# Patient Record
Sex: MALE | Race: WHITE | NOT HISPANIC OR LATINO | Employment: OTHER | URBAN - METROPOLITAN AREA
[De-identification: names, ages, dates, MRNs, and addresses within clinical notes are randomized per-mention and may not be internally consistent; named-entity substitution may affect disease eponyms.]

---

## 2017-09-15 ENCOUNTER — HOSPITAL ENCOUNTER (EMERGENCY)
Facility: HOSPITAL | Age: 47
Discharge: HOME | End: 2017-09-15
Attending: EMERGENCY MEDICINE | Admitting: EMERGENCY MEDICINE
Payer: COMMERCIAL

## 2017-09-15 VITALS
SYSTOLIC BLOOD PRESSURE: 134 MMHG | OXYGEN SATURATION: 97 % | DIASTOLIC BLOOD PRESSURE: 73 MMHG | TEMPERATURE: 98.1 F | RESPIRATION RATE: 16 BRPM | HEART RATE: 78 BPM | WEIGHT: 179.4 LBS

## 2017-09-15 DIAGNOSIS — S81.811A LACERATION OF LEG, RIGHT, INITIAL ENCOUNTER: Primary | ICD-10-CM

## 2017-09-15 PROCEDURE — 99282 EMERGENCY DEPT VISIT SF MDM: CPT

## 2017-09-15 RX ORDER — OMEPRAZOLE 20 MG/1
40 CAPSULE, DELAYED RELEASE ORAL DAILY
COMMUNITY

## 2017-09-15 RX ORDER — LIDOCAINE HYDROCHLORIDE 10 MG/ML
5 INJECTION, SOLUTION EPIDURAL; INFILTRATION; INTRACAUDAL; PERINEURAL ONCE
Status: COMPLETED | OUTPATIENT
Start: 2017-09-15 | End: 2017-09-15

## 2017-09-15 RX ADMIN — LIDOCAINE HYDROCHLORIDE 5 ML: 10 INJECTION, SOLUTION EPIDURAL; INFILTRATION; INTRACAUDAL; PERINEURAL at 11:06

## 2019-07-27 ENCOUNTER — APPOINTMENT (EMERGENCY)
Dept: RADIOLOGY | Facility: HOSPITAL | Age: 49
End: 2019-07-27
Payer: COMMERCIAL

## 2019-07-27 ENCOUNTER — HOSPITAL ENCOUNTER (EMERGENCY)
Facility: HOSPITAL | Age: 49
Discharge: HOME | End: 2019-07-27
Attending: EMERGENCY MEDICINE
Payer: COMMERCIAL

## 2019-07-27 VITALS
WEIGHT: 180 LBS | RESPIRATION RATE: 15 BRPM | SYSTOLIC BLOOD PRESSURE: 114 MMHG | HEIGHT: 69 IN | TEMPERATURE: 98.1 F | BODY MASS INDEX: 26.66 KG/M2 | DIASTOLIC BLOOD PRESSURE: 63 MMHG | OXYGEN SATURATION: 96 % | HEART RATE: 66 BPM

## 2019-07-27 DIAGNOSIS — R07.9 CHEST PAIN: Primary | ICD-10-CM

## 2019-07-27 DIAGNOSIS — M79.10 MYALGIA: ICD-10-CM

## 2019-07-27 LAB
ANION GAP SERPL CALCULATED.3IONS-SCNC: 7 MMOL/L (ref 4–13)
BASOPHILS # BLD AUTO: 0.02 THOUSANDS/ΜL (ref 0–0.1)
BASOPHILS NFR BLD AUTO: 0 % (ref 0–1)
BUN SERPL-MCNC: 12 MG/DL (ref 5–25)
CALCIUM SERPL-MCNC: 8.4 MG/DL (ref 8.3–10.1)
CHLORIDE SERPL-SCNC: 109 MMOL/L (ref 100–108)
CK SERPL-CCNC: 86 U/L (ref 39–308)
CO2 SERPL-SCNC: 25 MMOL/L (ref 21–32)
CREAT SERPL-MCNC: 0.94 MG/DL (ref 0.6–1.3)
EOSINOPHIL # BLD AUTO: 0.08 THOUSAND/ΜL (ref 0–0.61)
EOSINOPHIL NFR BLD AUTO: 1 % (ref 0–6)
ERYTHROCYTE [DISTWIDTH] IN BLOOD BY AUTOMATED COUNT: 12.3 % (ref 11.6–15.1)
GFR SERPL CREATININE-BSD FRML MDRD: 97 ML/MIN/1.73SQ M
GLUCOSE SERPL-MCNC: 168 MG/DL (ref 65–140)
HCT VFR BLD AUTO: 44.7 % (ref 36.5–49.3)
HGB BLD-MCNC: 15.5 G/DL (ref 12–17)
IMM GRANULOCYTES # BLD AUTO: 0.01 THOUSAND/UL (ref 0–0.2)
IMM GRANULOCYTES NFR BLD AUTO: 0 % (ref 0–2)
LYMPHOCYTES # BLD AUTO: 1.3 THOUSANDS/ΜL (ref 0.6–4.47)
LYMPHOCYTES NFR BLD AUTO: 22 % (ref 14–44)
MCH RBC QN AUTO: 29.6 PG (ref 26.8–34.3)
MCHC RBC AUTO-ENTMCNC: 34.7 G/DL (ref 31.4–37.4)
MCV RBC AUTO: 86 FL (ref 82–98)
MONOCYTES # BLD AUTO: 0.47 THOUSAND/ΜL (ref 0.17–1.22)
MONOCYTES NFR BLD AUTO: 8 % (ref 4–12)
NEUTROPHILS # BLD AUTO: 4.16 THOUSANDS/ΜL (ref 1.85–7.62)
NEUTS SEG NFR BLD AUTO: 69 % (ref 43–75)
NRBC BLD AUTO-RTO: 0 /100 WBCS
PLATELET # BLD AUTO: 163 THOUSANDS/UL (ref 149–390)
PMV BLD AUTO: 9.5 FL (ref 8.9–12.7)
POTASSIUM SERPL-SCNC: 3.7 MMOL/L (ref 3.5–5.3)
RBC # BLD AUTO: 5.23 MILLION/UL (ref 3.88–5.62)
SODIUM SERPL-SCNC: 141 MMOL/L (ref 136–145)
TROPONIN I SERPL-MCNC: <0.02 NG/ML
TROPONIN I SERPL-MCNC: <0.02 NG/ML
TSH SERPL DL<=0.05 MIU/L-ACNC: 0.92 UIU/ML (ref 0.36–3.74)
WBC # BLD AUTO: 6.04 THOUSAND/UL (ref 4.31–10.16)

## 2019-07-27 PROCEDURE — 93005 ELECTROCARDIOGRAM TRACING: CPT

## 2019-07-27 PROCEDURE — 99284 EMERGENCY DEPT VISIT MOD MDM: CPT

## 2019-07-27 PROCEDURE — 84484 ASSAY OF TROPONIN QUANT: CPT | Performed by: EMERGENCY MEDICINE

## 2019-07-27 PROCEDURE — 80048 BASIC METABOLIC PNL TOTAL CA: CPT | Performed by: EMERGENCY MEDICINE

## 2019-07-27 PROCEDURE — 99283 EMERGENCY DEPT VISIT LOW MDM: CPT | Performed by: EMERGENCY MEDICINE

## 2019-07-27 PROCEDURE — 71046 X-RAY EXAM CHEST 2 VIEWS: CPT

## 2019-07-27 PROCEDURE — 36415 COLL VENOUS BLD VENIPUNCTURE: CPT | Performed by: EMERGENCY MEDICINE

## 2019-07-27 PROCEDURE — 82550 ASSAY OF CK (CPK): CPT | Performed by: EMERGENCY MEDICINE

## 2019-07-27 PROCEDURE — 84443 ASSAY THYROID STIM HORMONE: CPT | Performed by: EMERGENCY MEDICINE

## 2019-07-27 PROCEDURE — 85025 COMPLETE CBC W/AUTO DIFF WBC: CPT | Performed by: EMERGENCY MEDICINE

## 2019-07-27 NOTE — ED ATTENDING ATTESTATION
Victor Hugo Ventura MD, saw and evaluated the patient  I have discussed the patient with the resident/non-physician practitioner and agree with the resident's/non-physician practitioner's findings, Plan of Care, and MDM as documented in the resident's/non-physician practitioner's note, except where noted  All available labs and Radiology studies were reviewed  I was present for key portions of any procedure(s) performed by the resident/non-physician practitioner and I was immediately available to provide assistance  At this point I agree with the current assessment done in the Emergency Department  I have conducted an independent evaluation of this patient a history and physical is as follows:    Patient presents with a complaint of bilateral arm and leg burning sensation  The patient reports he was in his normal state of health yesterday and last night when he laid down to go to bed at approximately midnight he developed a burning discomfort in both arms and both legs  The patient reports the symptoms severity was similar in both his arms and legs and rated it is moderate  The patient states that the burning sensation starts in the proximal extremity in the radiates into the forearm and calf  The patient reports that those symptoms have been continuous  The arm burning discomfort has also somewhat radiated across the chest throughout the night also  The patient reports this burning discomfort in the arms chest and legs has been constant since it began at approximately midnight  The patient denies any dyspnea, nausea, diaphoresis, or change with exertion  The patient denies any pleuritic symptoms  The patient admits to a long history of burning discomfort in both his arms and his legs the neck going on for at least 10 years  The patient had been evaluated by a cardiologist at that time  The patient reports that over the last 3 months he has had this burning discomfort at least every other day  The only different with the symptoms is that they are lasting longer than usual   The patient denies use risk factors  The patient also denies a history of hypertension, hypercholesterolemia, diabetes, smoking, or family history of cardiovascular disease before the age of 72  The patient is a nonsmoker  Physical exam demonstrates a pleasant alert nontoxic male in no acute distress  HEENT exam is normal   Lungs are clear with equal breath sounds  The heart had a regular rate rhythm  The chest was nontender  The abdomen is soft and nontender  The patient had normal strength and sensation all extremities  The patient's gait was normal   The patient had normal pulses in all extremities  Skin had no rash  Neurologic exam demonstrated no deficits    Critical Care Time  Procedures

## 2019-07-27 NOTE — ED PROVIDER NOTES
History  Chief Complaint   Patient presents with    Generalized Body Aches     Pt has c/o b/l arm aches that go across upper chest, upper back pain, and weakness since last night  58 yo M presents to ED for burning/soreness/weakness of his bilateral proximal upper and lower extremities and also radiating into his chest  Pt says this started yesterday evening and has not stopped  Pt says he has had similar symptoms in the past but they never lasted this long  He last was seen for same thing years ago  Says he was afraid he was having a heart attack and was seen by cardiologist and had normal stress test  Pt says he was ultimately referred to neurology and had a shot in his neck that might have helped  He has not noticed any actual weakness  Normal gait, not falling or dropping things  Denies neck pain  Denies shortness of breath, diaphoresis, nausea/vomiting  Prior to Admission Medications   Prescriptions Last Dose Informant Patient Reported? Taking?   omeprazole (PriLOSEC) 20 mg delayed release capsule 7/26/2019 at Unknown time  Yes Yes   Sig: Take 20 mg by mouth daily      Facility-Administered Medications: None       Past Medical History:   Diagnosis Date    GERD (gastroesophageal reflux disease)        History reviewed  No pertinent surgical history  History reviewed  No pertinent family history  I have reviewed and agree with the history as documented  Social History     Tobacco Use    Smoking status: Never Smoker    Smokeless tobacco: Never Used   Substance Use Topics    Alcohol use: No    Drug use: No        Review of Systems   Constitutional: Negative for chills, fatigue and fever  HENT: Negative for congestion, rhinorrhea, sore throat and trouble swallowing  Eyes: Negative for pain, discharge and visual disturbance  Respiratory: Negative for cough, chest tightness and shortness of breath  Cardiovascular: Positive for chest pain  Negative for palpitations and leg swelling  Gastrointestinal: Negative for abdominal pain, nausea and vomiting  Genitourinary: Negative for decreased urine volume, dysuria, frequency and urgency  Musculoskeletal: Negative for gait problem, neck pain and neck stiffness  Skin: Negative for color change, rash and wound  Neurological: Negative for dizziness, syncope, light-headedness and headaches  Physical Exam  ED Triage Vitals [07/27/19 1041]   Temperature Pulse Respirations Blood Pressure SpO2   98 1 °F (36 7 °C) 93 18 142/94 96 %      Temp Source Heart Rate Source Patient Position - Orthostatic VS BP Location FiO2 (%)   Oral Monitor Sitting Left arm --      Pain Score       6             Orthostatic Vital Signs  Vitals:    07/27/19 1230 07/27/19 1330 07/27/19 1430 07/27/19 1500   BP: 121/63 117/63 117/57 114/63   Pulse: 78 72 74 66   Patient Position - Orthostatic VS: Lying Sitting  Sitting       Physical Exam   Constitutional: He is oriented to person, place, and time  He appears well-developed and well-nourished  No distress  HENT:   Head: Normocephalic and atraumatic  Mouth/Throat: Oropharynx is clear and moist    Eyes: Conjunctivae and EOM are normal  Right eye exhibits no discharge  Left eye exhibits no discharge  Neck: Normal range of motion  Neck supple  nontender   Cardiovascular: Normal rate, regular rhythm and intact distal pulses  Pulmonary/Chest: Effort normal and breath sounds normal  No stridor  No respiratory distress  He exhibits no tenderness  Abdominal: Soft  Bowel sounds are normal  There is no tenderness  There is no rebound and no guarding  Musculoskeletal: Normal range of motion  He exhibits no edema or tenderness  Neurological: He is alert and oriented to person, place, and time  No cranial nerve deficit or sensory deficit  He exhibits normal muscle tone (normal strength 5/5 bilateral upper and lower extremities)  Coordination normal    Skin: Skin is warm and dry   Capillary refill takes less than 2 seconds  Nursing note and vitals reviewed        ED Medications  Medications - No data to display    Diagnostic Studies  Results Reviewed     Procedure Component Value Units Date/Time    Troponin I [[id number]]  (Normal) Collected:  07/27/19 1430    Lab Status:  Final result Specimen:  Blood from Arm, Left Updated:  07/27/19 1547     Troponin I <0 02 ng/mL     Troponin I [[id number]]  (Normal) Collected:  07/27/19 1124    Lab Status:  Final result Specimen:  Blood from Arm, Left Updated:  07/27/19 1202     Troponin I <0 02 ng/mL     Basic metabolic panel [[id number]]  (Abnormal) Collected:  07/27/19 1124    Lab Status:  Final result Specimen:  Blood from Arm, Left Updated:  07/27/19 1157     Sodium 141 mmol/L      Potassium 3 7 mmol/L      Chloride 109 mmol/L      CO2 25 mmol/L      ANION GAP 7 mmol/L      BUN 12 mg/dL      Creatinine 0 94 mg/dL      Glucose 168 mg/dL      Calcium 8 4 mg/dL      eGFR 97 ml/min/1 73sq m     Narrative:       [place] guidelines for Chronic Kidney Disease (CKD):     Stage 1 with normal or high GFR (GFR > 90 mL/min/1 73 square meters)    Stage 2 Mild CKD (GFR = 60-89 mL/min/1 73 square meters)    Stage 3A Moderate CKD (GFR = 45-59 mL/min/1 73 square meters)    Stage 3B Moderate CKD (GFR = 30-44 mL/min/1 73 square meters)    Stage 4 Severe CKD (GFR = 15-29 mL/min/1 73 square meters)    Stage 5 End Stage CKD (GFR <15 mL/min/1 73 square meters)  Note: GFR calculation is accurate only with a steady state creatinine    CK (with reflex to MB) [[id number]]  (Normal) Collected:  07/27/19 1124    Lab Status:  Final result Specimen:  Blood from Arm, Left Updated:  07/27/19 1157     Total CK 86 U/L     TSH, 3rd generation with Free T4 reflex [[id number]]  (Normal) Collected:  07/27/19 1124    Lab Status:  Final result Specimen:  Blood from Arm, Left Updated:  07/27/19 1157     TSH 3RD GENERATON 0 922 uIU/mL     Narrative:       Patients undergoing fluorescein dye angiography may retain small amounts of fluorescein in the body for 48-72 hours post procedure  Samples containing fluorescein can produce falsely depressed TSH values  If the patient had this procedure,a specimen should be resubmitted post fluorescein clearance  CBC and differential [[id number]] Collected:  07/27/19 1124    Lab Status:  Final result Specimen:  Blood from Arm, Left Updated:  07/27/19 1135     WBC 6 04 Thousand/uL      RBC 5 23 Million/uL      Hemoglobin 15 5 g/dL      Hematocrit 44 7 %      MCV 86 fL      MCH 29 6 pg      MCHC 34 7 g/dL      RDW 12 3 %      MPV 9 5 fL      Platelets 647 Thousands/uL      nRBC 0 /100 WBCs      Neutrophils Relative 69 %      Immat GRANS % 0 %      Lymphocytes Relative 22 %      Monocytes Relative 8 %      Eosinophils Relative 1 %      Basophils Relative 0 %      Neutrophils Absolute 4 16 Thousands/µL      Immature Grans Absolute 0 01 Thousand/uL      Lymphocytes Absolute 1 30 Thousands/µL      Monocytes Absolute 0 47 Thousand/µL      Eosinophils Absolute 0 08 Thousand/µL      Basophils Absolute 0 02 Thousands/µL                  XR chest 2 views   Final Result by Chris Rizzo DO (07/27 1153)      No acute cardiopulmonary disease  Workstation performed: [id number]               Procedures  ECG 12 Lead Documentation Only  Date/Time: 7/27/2019 11:44 AM  Performed by: Keshav Preston MD  Authorized by:  Keshav Preston MD     Indications / Diagnosis:  Chest pain  ECG reviewed by me, the ED Provider: yes    Patient location:  ED  Previous ECG:     Previous ECG:  Unavailable  Rate:     ECG rate:  78    ECG rate assessment: normal    Rhythm:     Rhythm: sinus rhythm    Ectopy:     Ectopy: none    ST segments:     ST segments:  Normal  T waves:     T waves: flattening and inverted      Flattening:  V2    Inverted:  V1            ED Course         HEART Risk Score      Most Recent Value   History  0 Filed at: 07/27/2019 1246   ECG  1 Filed at: 07/27/2019 1246   Age  1 Filed at: 07/27/2019 1246   Risk Factors  0 Filed at: 07/27/2019 1246   Troponin  0 Filed at: 07/27/2019 1246   Heart Score Risk Calculator   History  0 Filed at: 07/27/2019 1246   ECG  1 Filed at: 07/27/2019 1246   Age  1 Filed at: 07/27/2019 1246   Risk Factors  0 Filed at: 07/27/2019 1246   Troponin  0 Filed at: 07/27/2019 1246   HEART Score  2 Filed at: 07/27/2019 1246   HEART Score  2 Filed at: 07/27/2019 1246                            MDM  Number of Diagnoses or Management Options  Chest pain:   Myalgia:   Diagnosis management comments: Heart score 2, no acute ischemic change on EKG  Delta troponin negative  Unlikely cardiac cause of pt's symptoms  Outpatient stress test ordered  No objective neuro deficit findings on exam  Normal strength and sensation  Normal CK  Follow up with PCP  Return precautions discussed  Disposition  Final diagnoses:   Chest pain   Myalgia     Time reflects when diagnosis was documented in both MDM as applicable and the Disposition within this note     Time User Action Codes Description Comment    7/27/2019  3:50 PM Nicole Herrera Add [R07 9] Chest pain     7/27/2019  3:50 PM Nicole Herrera Add [M79 10] Myalgia       ED Disposition     ED Disposition Condition Date/Time Comment    Discharge Stable Sat Jul 27, 2019  3:50 PM Moises Scanlon discharge to home/self care              Follow-up Information     Follow up With Specialties Details Why Contact Info Additional [address] Emergency Department Emergency Medicine  As needed, If symptoms worsen [address]  [phone]  ED, [address] [place] [address], [address], MD Family Medicine  As needed [address]  [address]   [phone]             Discharge Medication List as of 7/27/2019  3:51 PM      CONTINUE these medications which have NOT CHANGED    Details   omeprazole (PriLOSEC) 20 mg delayed release capsule Take 20 mg by mouth daily, Historical Med           Outpatient Discharge Orders   Stress test only, exercise   Standing Status: Future Standing Exp  Date: 07/27/23       ED Provider  Attending physically available and evaluated Hollis Latif I managed the patient along with the ED Attending      Electronically Signed by         Colby Schaeffer MD  07/30/19 1869

## 2019-07-29 LAB
ATRIAL RATE: 78 BPM
P AXIS: 79 DEGREES
PR INTERVAL: 152 MS
QRS AXIS: 266 DEGREES
QRSD INTERVAL: 90 MS
QT INTERVAL: 370 MS
QTC INTERVAL: 421 MS
T WAVE AXIS: 54 DEGREES
VENTRICULAR RATE: 78 BPM

## 2019-07-29 PROCEDURE — 93010 ELECTROCARDIOGRAM REPORT: CPT | Performed by: INTERNAL MEDICINE

## 2019-07-30 ENCOUNTER — TRANSCRIBE ORDERS (OUTPATIENT)
Dept: ADMINISTRATIVE | Facility: HOSPITAL | Age: 49
End: 2019-07-30

## 2019-07-30 DIAGNOSIS — K27.9 PEPTIC ULCER WITHOUT HEMORRHAGE OR PERFORATION BUT WITH OBSTRUCTION (HCC): ICD-10-CM

## 2019-07-30 DIAGNOSIS — M62.81 MUSCLE WEAKNESS (GENERALIZED): ICD-10-CM

## 2019-07-30 DIAGNOSIS — K56.609 PEPTIC ULCER WITHOUT HEMORRHAGE OR PERFORATION BUT WITH OBSTRUCTION (HCC): ICD-10-CM

## 2019-07-30 DIAGNOSIS — K21.9 GASTROESOPHAGEAL REFLUX DISEASE, ESOPHAGITIS PRESENCE NOT SPECIFIED: ICD-10-CM

## 2019-07-30 DIAGNOSIS — M48.02 SPINAL STENOSIS IN CERVICAL REGION: Primary | ICD-10-CM

## 2019-08-06 ENCOUNTER — HOSPITAL ENCOUNTER (OUTPATIENT)
Dept: RADIOLOGY | Facility: HOSPITAL | Age: 49
Discharge: HOME | End: 2019-08-06
Payer: COMMERCIAL

## 2019-08-06 DIAGNOSIS — K56.609 PEPTIC ULCER WITHOUT HEMORRHAGE OR PERFORATION BUT WITH OBSTRUCTION (HCC): ICD-10-CM

## 2019-08-06 DIAGNOSIS — K27.9 PEPTIC ULCER WITHOUT HEMORRHAGE OR PERFORATION BUT WITH OBSTRUCTION (HCC): ICD-10-CM

## 2019-08-06 DIAGNOSIS — K21.9 GASTROESOPHAGEAL REFLUX DISEASE, ESOPHAGITIS PRESENCE NOT SPECIFIED: ICD-10-CM

## 2019-08-06 PROCEDURE — 74240 X-RAY XM UPR GI TRC 1CNTRST: CPT

## 2019-08-24 ENCOUNTER — HOSPITAL ENCOUNTER (OUTPATIENT)
Dept: RADIOLOGY | Facility: HOSPITAL | Age: 49
Discharge: HOME | End: 2019-08-24
Payer: COMMERCIAL

## 2019-08-24 DIAGNOSIS — M62.81 MUSCLE WEAKNESS (GENERALIZED): ICD-10-CM

## 2019-08-24 DIAGNOSIS — M48.02 SPINAL STENOSIS IN CERVICAL REGION: ICD-10-CM

## 2019-08-24 PROCEDURE — 72141 MRI NECK SPINE W/O DYE: CPT

## 2019-10-12 ENCOUNTER — APPOINTMENT (EMERGENCY)
Dept: RADIOLOGY | Facility: HOSPITAL | Age: 49
End: 2019-10-12
Payer: COMMERCIAL

## 2019-10-12 ENCOUNTER — HOSPITAL ENCOUNTER (EMERGENCY)
Facility: HOSPITAL | Age: 49
Discharge: HOME | End: 2019-10-12
Attending: EMERGENCY MEDICINE | Admitting: EMERGENCY MEDICINE
Payer: COMMERCIAL

## 2019-10-12 VITALS
TEMPERATURE: 98.2 F | HEART RATE: 90 BPM | BODY MASS INDEX: 27.28 KG/M2 | DIASTOLIC BLOOD PRESSURE: 87 MMHG | HEIGHT: 68 IN | RESPIRATION RATE: 19 BRPM | WEIGHT: 180 LBS | OXYGEN SATURATION: 97 % | SYSTOLIC BLOOD PRESSURE: 162 MMHG

## 2019-10-12 DIAGNOSIS — R51.9 HEADACHE: Primary | ICD-10-CM

## 2019-10-12 DIAGNOSIS — H53.9 VISUAL DISTURBANCE: ICD-10-CM

## 2019-10-12 LAB
ANION GAP SERPL CALCULATED.3IONS-SCNC: 5 MMOL/L (ref 4–13)
BASOPHILS # BLD AUTO: 0.03 THOUSANDS/ΜL (ref 0–0.1)
BASOPHILS NFR BLD AUTO: 0 % (ref 0–1)
BUN SERPL-MCNC: 12 MG/DL (ref 5–25)
CALCIUM SERPL-MCNC: 9.5 MG/DL (ref 8.3–10.1)
CHLORIDE SERPL-SCNC: 109 MMOL/L (ref 100–108)
CO2 SERPL-SCNC: 28 MMOL/L (ref 21–32)
CREAT SERPL-MCNC: 0.98 MG/DL (ref 0.6–1.3)
EOSINOPHIL # BLD AUTO: 0.05 THOUSAND/ΜL (ref 0–0.61)
EOSINOPHIL NFR BLD AUTO: 1 % (ref 0–6)
ERYTHROCYTE [DISTWIDTH] IN BLOOD BY AUTOMATED COUNT: 12.6 % (ref 11.6–15.1)
GFR SERPL CREATININE-BSD FRML MDRD: 91 ML/MIN/1.73SQ M
GLUCOSE SERPL-MCNC: 117 MG/DL (ref 65–140)
HCT VFR BLD AUTO: 46.8 % (ref 36.5–49.3)
HGB BLD-MCNC: 16 G/DL (ref 12–17)
IMM GRANULOCYTES # BLD AUTO: 0.02 THOUSAND/UL (ref 0–0.2)
IMM GRANULOCYTES NFR BLD AUTO: 0 % (ref 0–2)
LYMPHOCYTES # BLD AUTO: 1.29 THOUSANDS/ΜL (ref 0.6–4.47)
LYMPHOCYTES NFR BLD AUTO: 14 % (ref 14–44)
MCH RBC QN AUTO: 29.6 PG (ref 26.8–34.3)
MCHC RBC AUTO-ENTMCNC: 34.2 G/DL (ref 31.4–37.4)
MCV RBC AUTO: 87 FL (ref 82–98)
MONOCYTES # BLD AUTO: 0.71 THOUSAND/ΜL (ref 0.17–1.22)
MONOCYTES NFR BLD AUTO: 7 % (ref 4–12)
NEUTROPHILS # BLD AUTO: 7.48 THOUSANDS/ΜL (ref 1.85–7.62)
NEUTS SEG NFR BLD AUTO: 78 % (ref 43–75)
NRBC BLD AUTO-RTO: 0 /100 WBCS
PLATELET # BLD AUTO: 189 THOUSANDS/UL (ref 149–390)
PMV BLD AUTO: 9.4 FL (ref 8.9–12.7)
POTASSIUM SERPL-SCNC: 3.9 MMOL/L (ref 3.5–5.3)
RBC # BLD AUTO: 5.4 MILLION/UL (ref 3.88–5.62)
SODIUM SERPL-SCNC: 142 MMOL/L (ref 136–145)
WBC # BLD AUTO: 9.58 THOUSAND/UL (ref 4.31–10.16)

## 2019-10-12 PROCEDURE — 96374 THER/PROPH/DIAG INJ IV PUSH: CPT

## 2019-10-12 PROCEDURE — 96375 TX/PRO/DX INJ NEW DRUG ADDON: CPT

## 2019-10-12 PROCEDURE — 99284 EMERGENCY DEPT VISIT MOD MDM: CPT | Performed by: EMERGENCY MEDICINE

## 2019-10-12 PROCEDURE — 80048 BASIC METABOLIC PNL TOTAL CA: CPT | Performed by: EMERGENCY MEDICINE

## 2019-10-12 PROCEDURE — 99284 EMERGENCY DEPT VISIT MOD MDM: CPT

## 2019-10-12 PROCEDURE — 36415 COLL VENOUS BLD VENIPUNCTURE: CPT | Performed by: EMERGENCY MEDICINE

## 2019-10-12 PROCEDURE — 85025 COMPLETE CBC W/AUTO DIFF WBC: CPT | Performed by: EMERGENCY MEDICINE

## 2019-10-12 PROCEDURE — 70450 CT HEAD/BRAIN W/O DYE: CPT

## 2019-10-12 RX ORDER — METOCLOPRAMIDE HYDROCHLORIDE 5 MG/ML
10 INJECTION INTRAMUSCULAR; INTRAVENOUS ONCE
Status: COMPLETED | OUTPATIENT
Start: 2019-10-12 | End: 2019-10-12

## 2019-10-12 RX ORDER — KETOROLAC TROMETHAMINE 30 MG/ML
15 INJECTION, SOLUTION INTRAMUSCULAR; INTRAVENOUS ONCE
Status: COMPLETED | OUTPATIENT
Start: 2019-10-12 | End: 2019-10-12

## 2019-10-12 RX ORDER — ACETAMINOPHEN 325 MG/1
650 TABLET ORAL ONCE
Status: COMPLETED | OUTPATIENT
Start: 2019-10-12 | End: 2019-10-12

## 2019-10-12 RX ADMIN — ACETAMINOPHEN 650 MG: 325 TABLET ORAL at 13:40

## 2019-10-12 RX ADMIN — KETOROLAC TROMETHAMINE 15 MG: 30 INJECTION, SOLUTION INTRAMUSCULAR at 13:41

## 2019-10-12 RX ADMIN — METOCLOPRAMIDE 10 MG: 5 INJECTION, SOLUTION INTRAMUSCULAR; INTRAVENOUS at 13:41

## 2019-10-12 NOTE — ED PROVIDER NOTES
History  Chief Complaint   Patient presents with    Blurred Vision     Pt reports reading yesterday and R side went "black " Pt reports getting a headache immediately after starting yesterday  Pt reports symptoms went away and now just feels like he has a "foggy head"     This is a 82-year-old male presenting emergency department for evaluation of a sudden vision change and a headache that occurred yesterday  Patient reports that he was reading when he started to noticed a black spot in his vision  He says that he could read 1 word but the word next to it was all dark doubt  He believes this was occurring and both eyes and the a spot moved when he moved his eyes  He notes the episode lasted approximately 10-15 minutes before spontaneously resolving  After have resolved he developed a gradual onset left frontal headache that was dull throbbing sensation  He describes as mild pain  The headache lasted for several hours and did improve with Advil  She noted development of mild nausea this morning and persists with a very mild headache today  He says that he just does not feel quite right  He called his primary care doctor referred him to the emergency department for further evaluation  He states that he does have a history of these visions spots in the distant past though nothing recently  He says in the past they have not been associated with headache  He also does have a history of migraine headaches which typically occur with severe nausea and are much more intense than his headache today  Review of systems otherwise negative  Prior to Admission Medications   Prescriptions Last Dose Informant Patient Reported?  Taking?   omeprazole (PriLOSEC) 20 mg delayed release capsule 10/11/2019 at Unknown time  Yes Yes   Sig: Take 40 mg by mouth daily       Facility-Administered Medications: None       Past Medical History:   Diagnosis Date    GERD (gastroesophageal reflux disease)        History reviewed  No pertinent surgical history  History reviewed  No pertinent family history  I have reviewed and agree with the history as documented  Social History     Tobacco Use    Smoking status: Never Smoker    Smokeless tobacco: Never Used   Substance Use Topics    Alcohol use: No    Drug use: No        Review of Systems   Constitutional: Negative for chills and fever  HENT: Negative for congestion and sore throat  Eyes: Positive for visual disturbance  Negative for photophobia and pain  Respiratory: Negative for cough and shortness of breath  Cardiovascular: Negative for chest pain and palpitations  Gastrointestinal: Negative for abdominal pain, diarrhea, nausea and vomiting  Genitourinary: Negative for difficulty urinating and dysuria  Musculoskeletal: Negative for myalgias  Skin: Negative for rash  Neurological: Positive for headaches  Negative for dizziness, tremors, seizures, syncope, facial asymmetry, speech difficulty, weakness, light-headedness and numbness  Physical Exam  ED Triage Vitals   Temperature Pulse Respirations Blood Pressure SpO2   10/12/19 1130 10/12/19 1130 10/12/19 1130 10/12/19 1130 10/12/19 1130   98 2 °F (36 8 °C) 90 19 162/87 97 %      Temp Source Heart Rate Source Patient Position - Orthostatic VS BP Location FiO2 (%)   10/12/19 1130 10/12/19 1130 10/12/19 1130 10/12/19 1130 --   Oral Monitor Sitting Right arm       Pain Score       10/12/19 1129       3             Orthostatic Vital Signs  Vitals:    10/12/19 1130   BP: 162/87   Pulse: 90   Patient Position - Orthostatic VS: Sitting       Physical Exam   Constitutional: He is oriented to person, place, and time  He appears well-developed and well-nourished  No distress  HENT:   Head: Normocephalic and atraumatic  Mouth/Throat: Oropharynx is clear and moist    Eyes: Pupils are equal, round, and reactive to light  EOM are normal    Neck: Normal range of motion  Neck supple     Cardiovascular: Normal rate, regular rhythm, normal heart sounds and intact distal pulses  Pulmonary/Chest: Effort normal and breath sounds normal    Abdominal: Soft  Bowel sounds are normal  There is no tenderness  Musculoskeletal: Normal range of motion  He exhibits no edema  Neurological: He is alert and oriented to person, place, and time  No cranial nerve deficit or sensory deficit  He exhibits normal muscle tone  Coordination normal    Normal finger nose, rapid alternating movements, Romberg, tandem gait  Normal visual fields  Skin: Skin is warm and dry  Capillary refill takes less than 2 seconds  Nursing note and vitals reviewed        ED Medications  Medications   acetaminophen (TYLENOL) tablet 650 mg (650 mg Oral Given 10/12/19 1340)   ketorolac (TORADOL) injection 15 mg (15 mg Intravenous Given 10/12/19 1341)   metoclopramide (REGLAN) injection 10 mg (10 mg Intravenous Given 10/12/19 1341)       Diagnostic Studies  Results Reviewed     Procedure Component Value Units Date/Time    Basic metabolic panel [[id number]]  (Abnormal) Collected:  10/12/19 1341    Lab Status:  Final result Specimen:  Blood from Arm, Right Updated:  10/12/19 1405     Sodium 142 mmol/L      Potassium 3 9 mmol/L      Chloride 109 mmol/L      CO2 28 mmol/L      ANION GAP 5 mmol/L      BUN 12 mg/dL      Creatinine 0 98 mg/dL      Glucose 117 mg/dL      Calcium 9 5 mg/dL      eGFR 91 ml/min/1 73sq m     Narrative:       [place] guidelines for Chronic Kidney Disease (CKD):     Stage 1 with normal or high GFR (GFR > 90 mL/min/1 73 square meters)    Stage 2 Mild CKD (GFR = 60-89 mL/min/1 73 square meters)    Stage 3A Moderate CKD (GFR = 45-59 mL/min/1 73 square meters)    Stage 3B Moderate CKD (GFR = 30-44 mL/min/1 73 square meters)    Stage 4 Severe CKD (GFR = 15-29 mL/min/1 73 square meters)    Stage 5 End Stage CKD (GFR <15 mL/min/1 73 square meters)  Note: GFR calculation is accurate only with a steady state creatinine    CBC and differential [[id number]]  (Abnormal) Collected:  10/12/19 1341    Lab Status:  Final result Specimen:  Blood from Arm, Right Updated:  10/12/19 1350     WBC 9 58 Thousand/uL      RBC 5 40 Million/uL      Hemoglobin 16 0 g/dL      Hematocrit 46 8 %      MCV 87 fL      MCH 29 6 pg      MCHC 34 2 g/dL      RDW 12 6 %      MPV 9 4 fL      Platelets 057 Thousands/uL      nRBC 0 /100 WBCs      Neutrophils Relative 78 %      Immat GRANS % 0 %      Lymphocytes Relative 14 %      Monocytes Relative 7 %      Eosinophils Relative 1 %      Basophils Relative 0 %      Neutrophils Absolute 7 48 Thousands/µL      Immature Grans Absolute 0 02 Thousand/uL      Lymphocytes Absolute 1 29 Thousands/µL      Monocytes Absolute 0 71 Thousand/µL      Eosinophils Absolute 0 05 Thousand/µL      Basophils Absolute 0 03 Thousands/µL                  CT head without contrast   Final Result by Rm Kilgore MD (10/12 1410)      No acute intracranial abnormality  Workstation performed: [id number]               Procedures  Procedures        ED Course                               MDM  Number of Diagnoses or Management Options  Diagnosis management comments: 53-year-old male presenting emergency department for evaluation of a transient episode of a visiting spot followed by headache  He has a history of migraines and a history these vision spots but never at the same time  I suspect that this is a new migraine type that he is experiencing  He has no focal neurologic deficits on my exam and is relatively young and healthy with few risk factors for TIA or stroke  He appears well  He is requesting CT scan  Will treat with Tylenol, Toradol, and Reglan and check a CT scan of his head as well as basic labs        Disposition  Final diagnoses:   Headache   Visual disturbance     Time reflects when diagnosis was documented in both MDM as applicable and the Disposition within this note     Time User Action Codes Description Comment    10/12/2019  2:27 PM Ant Garza Add [R51] Headache     10/12/2019  2:27 PM Alonso Stovall Add [H53 9] Visual disturbance       ED Disposition     ED Disposition Condition Date/Time Comment    Discharge Stable Sat Oct 12, 2019  2:27 PM Maxim Vazquez discharge to home/self care  Follow-up Information     Follow up With Specialties Details Why Contact Info Additional [facility] Neurology [facility] Neurology Schedule an appointment as soon as possible for a visit   [facility] [id number]  [address] [facility] Neurology [facility], [address] [facility], [place], [place], [address] [place] [address]    Shailesh Thomson MD Family Medicine Schedule an appointment as soon as possible for a visit   [facility] 30  [address] [facility]  [address] [facility]       Aysha Sampson MD Ophthalmology Schedule an appointment as soon as possible for a visit   [facility] [address] [address]  [phone]             Discharge Medication List as of 10/12/2019  2:43 PM      CONTINUE these medications which have NOT CHANGED    Details   omeprazole (PriLOSEC) 20 mg delayed release capsule Take 40 mg by mouth daily , Historical Med           No discharge procedures on file  ED Provider  Attending physically available and evaluated Maxim Vazquez I managed the patient along with the ED Attending      Electronically Signed by         Daisy Lake MD  10/13/19 8921

## 2019-10-12 NOTE — ED ATTENDING ATTESTATION
10/12/2019  IKeyla DO, saw and evaluated the patient  I have discussed the patient with the resident/non-physician practitioner and agree with the resident's/non-physician practitioner's findings, Plan of Care, and MDM as documented in the resident's/non-physician practitioner's note, except where noted  All available labs and Radiology studies were reviewed  I was present for key portions of any procedure(s) performed by the resident/non-physician practitioner and I was immediately available to provide assistance  At this point I agree with the current assessment done in the Emergency Department  I have conducted an independent evaluation of this patient a history and physical is as follows:    ED Course     Emergency Department Note- Martina Castillo 54 y o  male MRN: [id number]    Unit/Bed#: ED 14 Encounter: [id number]    Martina Castillo is a 54 y o  male who presents with   Chief Complaint   Patient presents with    Blurred Vision     Pt reports reading yesterday and R side went "black " Pt reports getting a headache immediately after starting yesterday  Pt reports symptoms went away and now just feels like he has a "foggy head"         History of Present Illness   HPI:  Martina Castillo is a 54 y o  male who presents with visual change and headache  Patient states yesterday he had an area on a book that he was reading that appeared black  It appeared to be old both eyes  It lasted several minutes  It resolved and the patient again developed a headache  Has had headaches previously  No other focal motor sensory neurological deficits  ROS is otherwise unremarkable  Historical Information   Past Medical History:   Diagnosis Date    GERD (gastroesophageal reflux disease)      History reviewed  No pertinent surgical history    Social History   Social History     Substance and Sexual Activity   Alcohol Use No     Social History     Substance and Sexual Activity   Drug Use No     Social History Tobacco Use   Smoking Status Never Smoker   Smokeless Tobacco Never Used       Family History:   Meds/Allergies     Allergies   Allergen Reactions    Tetanus-Diphth-Acell Pertussis [Tetanus-Diphth-Acell Pertussis]      Pain         Objective   First Vitals:   Blood Pressure: 162/87 (10/12/19 1130)  Pulse: 90 (10/12/19 1130)  Temperature: 98 2 °F (36 8 °C) (10/12/19 1130)  Temp Source: Oral (10/12/19 1130)  Respirations: 19 (10/12/19 1130)  Height: 5' 8" (172 7 cm) (10/12/19 1129)  Weight - Scale: 81 6 kg (180 lb) (10/12/19 1129)  SpO2: 97 % (10/12/19 1130)    Current Vitals:   Blood Pressure: 162/87 (10/12/19 1130)  Pulse: 90 (10/12/19 1130)  Temperature: 98 2 °F (36 8 °C) (10/12/19 1130)  Temp Source: Oral (10/12/19 1130)  Respirations: 19 (10/12/19 1130)  Height: 5' 8" (172 7 cm) (10/12/19 1129)  Weight - Scale: 81 6 kg (180 lb) (10/12/19 1129)  SpO2: 97 % (10/12/19 1130)    No intake or output data in the 24 hours ending 10/12/19 1441    Invasive Devices     None                       Medical Decision Makin  CT negative  Labs negative  Headache has resolved  Follow up with PCP, may need neurology evaluation, Ophthalmology evaluation  No focal motor sensory neurological deficits  Ambulatory upon discharge without difficulty      Recent Results (from the past 36 hour(s))   CBC and differential    Collection Time: 10/12/19  1:41 PM   Result Value Ref Range    WBC 9 58 4 31 - 10 16 Thousand/uL    RBC 5 40 3 88 - 5 62 Million/uL    Hemoglobin 16 0 12 0 - 17 0 g/dL    Hematocrit 46 8 36 5 - 49 3 %    MCV 87 82 - 98 fL    MCH 29 6 26 8 - 34 3 pg    MCHC 34 2 31 4 - 37 4 g/dL    RDW 12 6 11 6 - 15 1 %    MPV 9 4 8 9 - 12 7 fL    Platelets 551 364 - 134 Thousands/uL    nRBC 0 /100 WBCs    Neutrophils Relative 78 (H) 43 - 75 %    Immat GRANS % 0 0 - 2 %    Lymphocytes Relative 14 14 - 44 %    Monocytes Relative 7 4 - 12 %    Eosinophils Relative 1 0 - 6 %    Basophils Relative 0 0 - 1 %    Neutrophils Absolute 7 48 1 85 - 7 62 Thousands/µL    Immature Grans Absolute 0 02 0 00 - 0 20 Thousand/uL    Lymphocytes Absolute 1 29 0 60 - 4 47 Thousands/µL    Monocytes Absolute 0 71 0 17 - 1 22 Thousand/µL    Eosinophils Absolute 0 05 0 00 - 0 61 Thousand/µL    Basophils Absolute 0 03 0 00 - 0 10 Thousands/µL   Basic metabolic panel    Collection Time: 10/12/19  1:41 PM   Result Value Ref Range    Sodium 142 136 - 145 mmol/L    Potassium 3 9 3 5 - 5 3 mmol/L    Chloride 109 (H) 100 - 108 mmol/L    CO2 28 21 - 32 mmol/L    ANION GAP 5 4 - 13 mmol/L    BUN 12 5 - 25 mg/dL    Creatinine 0 98 0 60 - 1 30 mg/dL    Glucose 117 65 - 140 mg/dL    Calcium 9 5 8 3 - 10 1 mg/dL    eGFR 91 ml/min/1 73sq m     CT head without contrast   Final Result      No acute intracranial abnormality  Workstation performed: [id number]               Portions of the record may have been created with voice recognition software  Occasional wrong word or "sound a like" substitutions may have occurred due to the inherent limitations of voice recognition software          Critical Care Time  Procedures

## 2019-10-12 NOTE — MEDICAL STUDENT
History     Chief Complaint   Patient presents with    Blurred Vision     Pt reports reading yesterday and R side went "black " Pt reports getting a headache immediately after starting yesterday  Pt reports symptoms went away and now just feels like he has a "foggy head"     HPI    Past Medical History:   Diagnosis Date    GERD (gastroesophageal reflux disease)        History reviewed  No pertinent surgical history  History reviewed  No pertinent family history      Social History     Tobacco Use    Smoking status: Never Smoker    Smokeless tobacco: Never Used   Substance Use Topics    Alcohol use: No    Drug use: No       Review of Systems    Physical Exam     ED Triage Vitals   Temperature Pulse Respirations Blood Pressure SpO2   10/12/19 1130 10/12/19 1130 10/12/19 1130 10/12/19 1130 10/12/19 1130   98 2 °F (36 8 °C) 90 19 162/87 97 %      Temp Source Heart Rate Source Patient Position - Orthostatic VS BP Location FiO2 (%)   10/12/19 1130 10/12/19 1130 10/12/19 1130 10/12/19 1130 --   Oral Monitor Sitting Right arm       Pain Score       10/12/19 1129       3           Physical Exam    ED Course

## 2019-10-14 ENCOUNTER — TRANSCRIBE ORDERS (OUTPATIENT)
Dept: ADMINISTRATIVE | Facility: HOSPITAL | Age: 49
End: 2019-10-14

## 2019-10-14 DIAGNOSIS — I25.119 CORONARY ARTERY DISEASE WITH ANGINA PECTORIS, UNSPECIFIED VESSEL OR LESION TYPE, UNSPECIFIED WHETHER NATIVE OR TRANSPLANTED HEART (HCC): Primary | ICD-10-CM

## 2019-10-15 ENCOUNTER — HOSPITAL ENCOUNTER (OUTPATIENT)
Facility: HOSPITAL | Age: 49
Setting detail: OBSERVATION
Discharge: HOME | End: 2019-10-17
Attending: EMERGENCY MEDICINE | Admitting: INTERNAL MEDICINE
Payer: COMMERCIAL

## 2019-10-15 ENCOUNTER — APPOINTMENT (EMERGENCY)
Dept: RADIOLOGY | Facility: HOSPITAL | Age: 49
End: 2019-10-15
Payer: COMMERCIAL

## 2019-10-15 ENCOUNTER — TELEPHONE (OUTPATIENT)
Dept: NEUROLOGY | Facility: CLINIC | Age: 49
End: 2019-10-15

## 2019-10-15 DIAGNOSIS — F43.29 STRESS AND ADJUSTMENT REACTION: ICD-10-CM

## 2019-10-15 DIAGNOSIS — R07.9 CHEST PAIN, UNSPECIFIED TYPE: Primary | ICD-10-CM

## 2019-10-15 DIAGNOSIS — G95.9 CERVICAL MYELOPATHY (HCC): ICD-10-CM

## 2019-10-15 DIAGNOSIS — R20.8 BURNING SENSATION: ICD-10-CM

## 2019-10-15 DIAGNOSIS — H53.9 VISUAL DISTURBANCE: ICD-10-CM

## 2019-10-15 DIAGNOSIS — F41.0 SEVERE ANXIETY WITH PANIC: ICD-10-CM

## 2019-10-15 PROBLEM — M50.30 DEGENERATIVE DISC DISEASE, CERVICAL: Status: ACTIVE | Noted: 2019-10-15

## 2019-10-15 PROBLEM — R00.2 INTERMITTENT PALPITATIONS: Status: ACTIVE | Noted: 2019-10-15

## 2019-10-15 PROBLEM — K21.9 GERD (GASTROESOPHAGEAL REFLUX DISEASE): Status: ACTIVE | Noted: 2019-10-15

## 2019-10-15 PROBLEM — F51.04 PSYCHOPHYSIOLOGICAL INSOMNIA: Status: ACTIVE | Noted: 2019-10-15

## 2019-10-15 LAB
ALBUMIN SERPL BCP-MCNC: 4.3 G/DL (ref 3.5–5)
ALP SERPL-CCNC: 68 U/L (ref 46–116)
ALT SERPL W P-5'-P-CCNC: 25 U/L (ref 12–78)
ANION GAP SERPL CALCULATED.3IONS-SCNC: 6 MMOL/L (ref 4–13)
AST SERPL W P-5'-P-CCNC: 8 U/L (ref 5–45)
BASOPHILS # BLD AUTO: 0.02 THOUSANDS/ΜL (ref 0–0.1)
BASOPHILS NFR BLD AUTO: 0 % (ref 0–1)
BILIRUB SERPL-MCNC: 0.38 MG/DL (ref 0.2–1)
BUN SERPL-MCNC: 18 MG/DL (ref 5–25)
CALCIUM SERPL-MCNC: 9.3 MG/DL (ref 8.3–10.1)
CHLORIDE SERPL-SCNC: 109 MMOL/L (ref 100–108)
CO2 SERPL-SCNC: 24 MMOL/L (ref 21–32)
CREAT SERPL-MCNC: 0.97 MG/DL (ref 0.6–1.3)
EOSINOPHIL # BLD AUTO: 0.03 THOUSAND/ΜL (ref 0–0.61)
EOSINOPHIL NFR BLD AUTO: 0 % (ref 0–6)
ERYTHROCYTE [DISTWIDTH] IN BLOOD BY AUTOMATED COUNT: 12.5 % (ref 11.6–15.1)
GFR SERPL CREATININE-BSD FRML MDRD: 93 ML/MIN/1.73SQ M
GLUCOSE SERPL-MCNC: 112 MG/DL (ref 65–140)
HCT VFR BLD AUTO: 43.6 % (ref 36.5–49.3)
HGB BLD-MCNC: 14.6 G/DL (ref 12–17)
IMM GRANULOCYTES # BLD AUTO: 0.03 THOUSAND/UL (ref 0–0.2)
IMM GRANULOCYTES NFR BLD AUTO: 0 % (ref 0–2)
LYMPHOCYTES # BLD AUTO: 1.54 THOUSANDS/ΜL (ref 0.6–4.47)
LYMPHOCYTES NFR BLD AUTO: 16 % (ref 14–44)
MCH RBC QN AUTO: 29.2 PG (ref 26.8–34.3)
MCHC RBC AUTO-ENTMCNC: 33.5 G/DL (ref 31.4–37.4)
MCV RBC AUTO: 87 FL (ref 82–98)
MONOCYTES # BLD AUTO: 0.81 THOUSAND/ΜL (ref 0.17–1.22)
MONOCYTES NFR BLD AUTO: 8 % (ref 4–12)
NEUTROPHILS # BLD AUTO: 7.53 THOUSANDS/ΜL (ref 1.85–7.62)
NEUTS SEG NFR BLD AUTO: 76 % (ref 43–75)
NRBC BLD AUTO-RTO: 0 /100 WBCS
PLATELET # BLD AUTO: 185 THOUSANDS/UL (ref 149–390)
PMV BLD AUTO: 9.3 FL (ref 8.9–12.7)
POTASSIUM SERPL-SCNC: 4 MMOL/L (ref 3.5–5.3)
PROT SERPL-MCNC: 7.5 G/DL (ref 6.4–8.2)
RBC # BLD AUTO: 5 MILLION/UL (ref 3.88–5.62)
SODIUM SERPL-SCNC: 139 MMOL/L (ref 136–145)
TROPONIN I SERPL-MCNC: <0.02 NG/ML
TROPONIN I SERPL-MCNC: <0.02 NG/ML
WBC # BLD AUTO: 9.96 THOUSAND/UL (ref 4.31–10.16)

## 2019-10-15 PROCEDURE — 85025 COMPLETE CBC W/AUTO DIFF WBC: CPT | Performed by: EMERGENCY MEDICINE

## 2019-10-15 PROCEDURE — 99220 PR INITIAL OBSERVATION CARE/DAY 70 MINUTES: CPT | Performed by: INTERNAL MEDICINE

## 2019-10-15 PROCEDURE — 81003 URINALYSIS AUTO W/O SCOPE: CPT | Performed by: INTERNAL MEDICINE

## 2019-10-15 PROCEDURE — 84484 ASSAY OF TROPONIN QUANT: CPT | Performed by: INTERNAL MEDICINE

## 2019-10-15 PROCEDURE — 93005 ELECTROCARDIOGRAM TRACING: CPT

## 2019-10-15 PROCEDURE — 84484 ASSAY OF TROPONIN QUANT: CPT | Performed by: EMERGENCY MEDICINE

## 2019-10-15 PROCEDURE — 99285 EMERGENCY DEPT VISIT HI MDM: CPT | Performed by: EMERGENCY MEDICINE

## 2019-10-15 PROCEDURE — 80053 COMPREHEN METABOLIC PANEL: CPT | Performed by: EMERGENCY MEDICINE

## 2019-10-15 PROCEDURE — 99285 EMERGENCY DEPT VISIT HI MDM: CPT

## 2019-10-15 PROCEDURE — 36415 COLL VENOUS BLD VENIPUNCTURE: CPT

## 2019-10-15 PROCEDURE — 71046 X-RAY EXAM CHEST 2 VIEWS: CPT

## 2019-10-15 RX ORDER — ESCITALOPRAM OXALATE 20 MG/1
20 TABLET ORAL DAILY
COMMUNITY
End: 2020-02-14 | Stop reason: ALTCHOICE

## 2019-10-15 RX ORDER — ACETAMINOPHEN 325 MG/1
650 TABLET ORAL EVERY 6 HOURS PRN
Status: DISCONTINUED | OUTPATIENT
Start: 2019-10-15 | End: 2019-10-17 | Stop reason: HOSPADM

## 2019-10-15 RX ORDER — LIDOCAINE HYDROCHLORIDE 20 MG/ML
15 SOLUTION OROPHARYNGEAL ONCE
Status: COMPLETED | OUTPATIENT
Start: 2019-10-15 | End: 2019-10-15

## 2019-10-15 RX ORDER — ESCITALOPRAM OXALATE 20 MG/1
20 TABLET ORAL DAILY
Status: DISCONTINUED | OUTPATIENT
Start: 2019-10-16 | End: 2019-10-17 | Stop reason: HOSPADM

## 2019-10-15 RX ORDER — PANTOPRAZOLE SODIUM 40 MG/1
40 TABLET, DELAYED RELEASE ORAL
Status: DISCONTINUED | OUTPATIENT
Start: 2019-10-16 | End: 2019-10-17 | Stop reason: HOSPADM

## 2019-10-15 RX ORDER — LORAZEPAM 2 MG/ML
0.5 INJECTION INTRAMUSCULAR EVERY 4 HOURS PRN
Status: DISCONTINUED | OUTPATIENT
Start: 2019-10-15 | End: 2019-10-17 | Stop reason: HOSPADM

## 2019-10-15 RX ORDER — ONDANSETRON 2 MG/ML
4 INJECTION INTRAMUSCULAR; INTRAVENOUS EVERY 6 HOURS PRN
Status: DISCONTINUED | OUTPATIENT
Start: 2019-10-15 | End: 2019-10-17 | Stop reason: HOSPADM

## 2019-10-15 RX ORDER — MAGNESIUM HYDROXIDE/ALUMINUM HYDROXICE/SIMETHICONE 120; 1200; 1200 MG/30ML; MG/30ML; MG/30ML
30 SUSPENSION ORAL EVERY 6 HOURS PRN
Status: DISCONTINUED | OUTPATIENT
Start: 2019-10-15 | End: 2019-10-17 | Stop reason: HOSPADM

## 2019-10-15 RX ORDER — SUCRALFATE ORAL 1 G/10ML
1000 SUSPENSION ORAL ONCE
Status: COMPLETED | OUTPATIENT
Start: 2019-10-15 | End: 2019-10-15

## 2019-10-15 RX ORDER — ASPIRIN 325 MG
325 TABLET ORAL ONCE
Status: COMPLETED | OUTPATIENT
Start: 2019-10-15 | End: 2019-10-15

## 2019-10-15 RX ORDER — DOCUSATE SODIUM 100 MG/1
100 CAPSULE, LIQUID FILLED ORAL 2 TIMES DAILY PRN
Status: DISCONTINUED | OUTPATIENT
Start: 2019-10-15 | End: 2019-10-17 | Stop reason: HOSPADM

## 2019-10-15 RX ORDER — TEMAZEPAM 15 MG/1
15 CAPSULE ORAL
Status: DISCONTINUED | OUTPATIENT
Start: 2019-10-15 | End: 2019-10-17 | Stop reason: HOSPADM

## 2019-10-15 RX ADMIN — TEMAZEPAM 15 MG: 15 CAPSULE ORAL at 22:52

## 2019-10-15 RX ADMIN — LIDOCAINE HYDROCHLORIDE 15 ML: 20 SOLUTION ORAL; TOPICAL at 20:45

## 2019-10-15 RX ADMIN — SUCRALFATE 1000 MG: 1 SUSPENSION ORAL at 20:45

## 2019-10-15 RX ADMIN — ASPIRIN 325 MG ORAL TABLET 325 MG: 325 PILL ORAL at 20:45

## 2019-10-16 LAB
ATRIAL RATE: 67 BPM
ATRIAL RATE: 68 BPM
ATRIAL RATE: 70 BPM
ATRIAL RATE: 83 BPM
BILIRUB UR QL STRIP: NEGATIVE
CHOLEST SERPL-MCNC: 178 MG/DL (ref 50–200)
CLARITY UR: CLEAR
COLOR UR: YELLOW
GLUCOSE UR STRIP-MCNC: NEGATIVE MG/DL
HDLC SERPL-MCNC: 39 MG/DL (ref 40–60)
HGB UR QL STRIP.AUTO: NEGATIVE
KETONES UR STRIP-MCNC: ABNORMAL MG/DL
LDLC SERPL CALC-MCNC: 130 MG/DL (ref 0–100)
LEUKOCYTE ESTERASE UR QL STRIP: NEGATIVE
NITRITE UR QL STRIP: NEGATIVE
NONHDLC SERPL-MCNC: 139 MG/DL
P AXIS: 61 DEGREES
P AXIS: 62 DEGREES
P AXIS: 67 DEGREES
P AXIS: 78 DEGREES
PH UR STRIP.AUTO: 5 [PH]
PR INTERVAL: 144 MS
PR INTERVAL: 158 MS
PR INTERVAL: 166 MS
PR INTERVAL: 168 MS
PROT UR STRIP-MCNC: NEGATIVE MG/DL
QRS AXIS: -48 DEGREES
QRS AXIS: -51 DEGREES
QRS AXIS: -70 DEGREES
QRS AXIS: -88 DEGREES
QRSD INTERVAL: 100 MS
QRSD INTERVAL: 100 MS
QRSD INTERVAL: 94 MS
QRSD INTERVAL: 98 MS
QT INTERVAL: 370 MS
QT INTERVAL: 418 MS
QT INTERVAL: 436 MS
QT INTERVAL: 438 MS
QTC INTERVAL: 434 MS
QTC INTERVAL: 451 MS
QTC INTERVAL: 460 MS
QTC INTERVAL: 465 MS
SP GR UR STRIP.AUTO: 1.02 (ref 1–1.03)
T WAVE AXIS: 16 DEGREES
T WAVE AXIS: 26 DEGREES
T WAVE AXIS: 47 DEGREES
T WAVE AXIS: 8 DEGREES
TRIGL SERPL-MCNC: 44 MG/DL
TROPONIN I SERPL-MCNC: <0.02 NG/ML
TSH SERPL DL<=0.05 MIU/L-ACNC: 1.14 UIU/ML (ref 0.36–3.74)
UROBILINOGEN UR QL STRIP.AUTO: 0.2 E.U./DL
VENTRICULAR RATE: 67 BPM
VENTRICULAR RATE: 68 BPM
VENTRICULAR RATE: 70 BPM
VENTRICULAR RATE: 83 BPM

## 2019-10-16 PROCEDURE — 80061 LIPID PANEL: CPT | Performed by: INTERNAL MEDICINE

## 2019-10-16 PROCEDURE — 84484 ASSAY OF TROPONIN QUANT: CPT | Performed by: INTERNAL MEDICINE

## 2019-10-16 PROCEDURE — 99245 OFF/OP CONSLTJ NEW/EST HI 55: CPT | Performed by: PSYCHIATRY & NEUROLOGY

## 2019-10-16 PROCEDURE — 93005 ELECTROCARDIOGRAM TRACING: CPT

## 2019-10-16 PROCEDURE — 93010 ELECTROCARDIOGRAM REPORT: CPT | Performed by: INTERNAL MEDICINE

## 2019-10-16 PROCEDURE — 99225 PR SBSQ OBSERVATION CARE/DAY 25 MINUTES: CPT | Performed by: NURSE PRACTITIONER

## 2019-10-16 PROCEDURE — 83520 IMMUNOASSAY QUANT NOS NONAB: CPT | Performed by: INTERNAL MEDICINE

## 2019-10-16 PROCEDURE — 84443 ASSAY THYROID STIM HORMONE: CPT | Performed by: INTERNAL MEDICINE

## 2019-10-16 RX ORDER — LANOLIN ALCOHOL/MO/W.PET/CERES
6 CREAM (GRAM) TOPICAL
Status: DISCONTINUED | OUTPATIENT
Start: 2019-10-16 | End: 2019-10-17 | Stop reason: HOSPADM

## 2019-10-16 RX ORDER — GABAPENTIN 100 MG/1
100 CAPSULE ORAL 3 TIMES DAILY
Status: DISCONTINUED | OUTPATIENT
Start: 2019-10-16 | End: 2019-10-17 | Stop reason: HOSPADM

## 2019-10-16 RX ADMIN — PANTOPRAZOLE SODIUM 40 MG: 40 TABLET, DELAYED RELEASE ORAL at 05:33

## 2019-10-16 RX ADMIN — GABAPENTIN 100 MG: 100 CAPSULE ORAL at 22:23

## 2019-10-16 RX ADMIN — LORAZEPAM 0.5 MG: 2 INJECTION INTRAMUSCULAR; INTRAVENOUS at 23:26

## 2019-10-16 RX ADMIN — MELATONIN 6 MG: 3 TAB ORAL at 22:23

## 2019-10-16 RX ADMIN — GABAPENTIN 100 MG: 100 CAPSULE ORAL at 09:55

## 2019-10-16 RX ADMIN — ESCITALOPRAM OXALATE 20 MG: 20 TABLET ORAL at 08:52

## 2019-10-16 RX ADMIN — GABAPENTIN 100 MG: 100 CAPSULE ORAL at 17:19

## 2019-10-16 NOTE — ASSESSMENT & PLAN NOTE
As patient has significant findings in MRI with degenerative disc disease, most likely this is the cause of his burning sensation bilateral arms; will get Neurology  Doubt however whether the patient would need any other intervention (such as NS)

## 2019-10-16 NOTE — ASSESSMENT & PLAN NOTE
Burning sensation of bilateral upper arms starts at the left side radiating towards the mid chest and then radiation towards the right side  Intermittent and not provoked by any action  Supplemented  This may be related to radiculopathy secondary to cervical degenerative disc disease as proven by MRI last month  Would like Neurology consult order to better assess this condition as it is causing patient more anxiety  Neuro checks every 4 hours for 24 hours

## 2019-10-16 NOTE — SOCIAL WORK
CM met with pt to provide [employer] and contact # for [employer] to apply for Waiver services for his wife  Pt accepted the info and stated that his wife also has a  at [facility]

## 2019-10-16 NOTE — DISCHARGE INSTRUCTIONS
Patient Instructions: Today you were seen in the emergency department for chest pain  We reviewed your EKG, your chest x-ray, and your labs and determined that you would be able to be discharged  You should take ibuprofen and tylenol as needed for your pain  You should follow up with your primary care doctor  Please return to the Emergency department if you have chest pain that continues to gets worse or you have any other symptoms that you are concerned about  Nice to meet you! Best of luck with everything!

## 2019-10-16 NOTE — ASSESSMENT & PLAN NOTE
Currently patient is experiencing multiple causes for increased stress and adjustment reaction  Would need case management  Will leave to day team whether they would like to get a formal psychiatric consult  But will continue with Lexapro  Will also place patient on Restoril to be able to get enough rest and sleep

## 2019-10-16 NOTE — ASSESSMENT & PLAN NOTE
· With suspected radiculopathy  · Gabapentin as above  · Consider outpatient evaluation with neurosurgery or spine and pain

## 2019-10-16 NOTE — ASSESSMENT & PLAN NOTE
· Started on Restoril on admission QHS  Would probably not continue at discharge  Will add melatonin

## 2019-10-16 NOTE — SOCIAL WORK
CM met with pt to discuss CM role in D/C planning  Pt reported the following:    Emergency Contact: Wife, Baldev Walker ([phone])  POA/LW: None  Level of assist with ADL's PTA: IND  House or Apt: 2 story home  NANCY to enter: Ramp to enter; Stair lift to 2nd floor  BATH on 1st floor: 1/2 bath  DME: None  VNA: None  SNF/Rehab: None     Transportation: Drives self  Help at home: Wife is paralyzed from chest down since 2017 when she walked into surgery for a herniated disk and then could never walk again post surgery per pt  Pt stated that wife was declined for social security because they own a home; denied for disability because wife was a stay at home mom for several years  Pt stated wife was approved for Medicaid for insurance  Wife attends OP therapy at [facility] 2 times per week and takes [employer]  Wife is doing a bowel program each night at 7pm and pt stated that he needs to be home tonight to do the bowel program with his wife  Pt stated that their medical bills have accumulated because they had to pay over $5,000 for a wet WC so that pt's wife could wheel herself into the shower and then use the sprayer  Pt stated that wet WC's are considered a luxury by insurance  Pt has made other modifications to his home that have cost much money  Pt asked about the bill for his hospital stay and CM encouraged pt to contact financial services  Pharmacy/Rx Coverage: [facility] [place]  [email or web address]  Name of PCP: Dr Allison Ureña tx for MH/SA: None  Pt reported that his father  on 2019  Pt open to scheduling a counseling appt  CM encouraged pt to call the # on the back of his [facility] [address] to find a counselor in-network or could log into his on-line portal with [employer] to view list of preferred providers  Pets or Dependents in the home: Children ages 14,17, 15, and 8 12year old will begin driving in [id number]      Employment/Income: Pt is self-employed and works with his 2 brothers who live in [facility] [facility]  Anticipated D/C Plan: Pt anticipates return home with brother, Vick Thompson to transport  CM reviewed d/c planning process including the following: identifying help at home, patient preference for d/c planning needs, Discharge Lounge, [facility] Meds to Bed program, availability of treatment team to discuss questions or concerns patient and/or family may have regarding understanding medications and recognizing signs and symptoms once discharged  CM also encouraged patient to follow up with all recommended appointments after discharge  Patient advised of importance for patient and family to participate in managing patients medical well being

## 2019-10-16 NOTE — ASSESSMENT & PLAN NOTE
Patient is due to be seen by Ophthalmology as out patient; with current complaints, will place ophthalmological consult for formal ophthalmological exam

## 2019-10-16 NOTE — ED PROVIDER NOTES
History  Chief Complaint   Patient presents with    Chest Pain     pt c/o chest pain/pressure that radiates down b/l arms  pt also c/o nausea  denies sob  This is a 54 y o  male PMH GERD who presents with chest pain for 3-4 hours  Patient was at work when the pain started and describes the pain as burning  The pain radiates from left to right chest pain and is 9/10 in severity at its worse  Patient says that his pain gets better without any intervention and says that it gets worse when he is resting/without any inciting event  It is similar to previous episodes of chest pain  He did have a near syncopal episode this past weekend   Presents to the ED at that time  He had a negative CT at that time    Patient denies any associated SOB, WILDER, vomiting, syncope, abdominal pain and back pain, but endorses nausea  Also denies fever, chills, diarrhea,  endorses cough for the past month  Patient denies any drug use including cocaine, denies any alcohol use, and denies smoking  He does seem to have stress at home and took Lexapro 20 mg today  He endorses that his wife is currently on disability and that combined the stomach too much money for further disability benefits and it is hard to take care of his for kids with her on disability and a recent death in his family  No previous stress tests/catheterizations/echocardiograms  Patient has received aspirin 325 mg here                History provided by:  Patient and relative   used: No    Chest Pain   Pain location:  L chest and R chest  Pain quality: burning and hot    Pain radiates to:  L arm and R arm  Pain radiates to the back: no    Pain severity:  Moderate  Onset quality:  Sudden  Timing:  Intermittent  Progression:  Waxing and waning  Chronicity:  Recurrent  Context: at rest and stress    Context: not breathing and not eating    Relieved by:  None tried  Worsened by:  Nothing tried  Ineffective treatments:  None tried  Associated symptoms: cough and nausea    Associated symptoms: no abdominal pain, no back pain, no diaphoresis, no fever, no numbness, no orthopnea, no shortness of breath and not vomiting    Cough:     Cough characteristics:  Non-productive    Severity:  Mild    Timing:  Intermittent    Progression:  Waxing and waning    Chronicity:  Recurrent  Nausea:     Severity:  Mild    Onset quality:  Gradual    Timing:  Intermittent  Risk factors: male sex    Risk factors: no coronary artery disease, no diabetes mellitus, no high cholesterol, no hypertension, no immobilization, no prior DVT/PE, no smoking and no surgery        Prior to Admission Medications   Prescriptions Last Dose Informant Patient Reported? Taking?   escitalopram (LEXAPRO) 20 mg tablet 10/15/2019 at Unknown time  Yes Yes   Sig: Take 20 mg by mouth daily   omeprazole (PriLOSEC) 20 mg delayed release capsule 10/15/2019 at Unknown time  Yes Yes   Sig: Take 40 mg by mouth daily       Facility-Administered Medications: None       Past Medical History:   Diagnosis Date    GERD (gastroesophageal reflux disease)        History reviewed  No pertinent surgical history  History reviewed  No pertinent family history  I have reviewed and agree with the history as documented  Social History     Tobacco Use    Smoking status: Never Smoker    Smokeless tobacco: Never Used   Substance Use Topics    Alcohol use: No    Drug use: No        Review of Systems   Constitutional: Negative for chills, diaphoresis and fever  HENT: Negative  Eyes: Negative  Negative for visual disturbance  Respiratory: Positive for cough  Negative for shortness of breath  Cardiovascular: Positive for chest pain  Negative for orthopnea  Gastrointestinal: Positive for nausea  Negative for abdominal pain and vomiting  Endocrine: Negative  Genitourinary: Negative  Musculoskeletal: Negative  Negative for back pain and myalgias  Skin: Negative  Negative for rash  Allergic/Immunologic: Negative  Neurological: Negative  Negative for light-headedness and numbness  Hematological: Negative  Psychiatric/Behavioral: Negative  All other systems reviewed and are negative  Physical Exam  ED Triage Vitals [10/15/19 1930]   Temperature Pulse Respirations Blood Pressure SpO2   98 7 °F (37 1 °C) 87 18 154/81 95 %      Temp Source Heart Rate Source Patient Position - Orthostatic VS BP Location FiO2 (%)   Oral Monitor Sitting Right arm --      Pain Score       7             Orthostatic Vital Signs  Vitals:    10/15/19 1930 10/15/19 2045 10/15/19 2130 10/15/19 2231   BP: 154/81 139/65 122/61 142/79   Pulse: 87 70 68 70   Patient Position - Orthostatic VS: Sitting Sitting Sitting        Physical Exam   Constitutional: He is oriented to person, place, and time  He appears well-developed and well-nourished  HENT:   Head: Normocephalic and atraumatic  Mouth/Throat: Oropharynx is clear and moist    Eyes: Conjunctivae are normal    Neck: Normal range of motion  Neck supple  Cardiovascular: Normal rate and regular rhythm  Pulmonary/Chest: Effort normal and breath sounds normal    Abdominal: Soft  He exhibits no distension  There is tenderness  Musculoskeletal: Normal range of motion  Neurological: He is alert and oriented to person, place, and time  Skin: Skin is warm and dry  Psychiatric: He has a normal mood and affect  Nursing note and vitals reviewed        ED Medications  Medications   escitalopram (LEXAPRO) tablet 20 mg (has no administration in time range)   pantoprazole (PROTONIX) EC tablet 40 mg (has no administration in time range)   temazepam (RESTORIL) capsule 15 mg (15 mg Oral Given 10/15/19 2252)   docusate sodium (COLACE) capsule 100 mg (has no administration in time range)   ondansetron (ZOFRAN) injection 4 mg (has no administration in time range)   aluminum-magnesium hydroxide-simethicone (MYLANTA) 200-200-20 mg/5 mL oral suspension 30 mL (has no administration in time range)   enoxaparin (LOVENOX) subcutaneous injection 40 mg (has no administration in time range)   acetaminophen (TYLENOL) tablet 650 mg (has no administration in time range)   LORazepam (ATIVAN) 2 mg/mL injection 0 5 mg (has no administration in time range)   aspirin tablet 325 mg (325 mg Oral Given 10/15/19 2045)   Lidocaine Viscous HCl (XYLOCAINE) 2 % mucosal solution 15 mL (15 mL Swish & Swallow Given 10/15/19 2045)   sucralfate (CARAFATE) oral suspension 1,000 mg (1,000 mg Oral Given 10/15/19 2045)       Diagnostic Studies  Results Reviewed     Procedure Component Value Units Date/Time    Troponin I [[id number]] Collected:  10/15/19 2248    Lab Status:   In process Specimen:  Blood from Arm, Right Updated:  10/15/19 2252    Comprehensive metabolic panel [[id number]]  (Abnormal) Collected:  10/15/19 1935    Lab Status:  Final result Specimen:  Blood from Arm, Left Updated:  10/15/19 2056     Sodium 139 mmol/L      Potassium 4 0 mmol/L      Chloride 109 mmol/L      CO2 24 mmol/L      ANION GAP 6 mmol/L      BUN 18 mg/dL      Creatinine 0 97 mg/dL      Glucose 112 mg/dL      Calcium 9 3 mg/dL      AST 8 U/L      ALT 25 U/L      Alkaline Phosphatase 68 U/L      Total Protein 7 5 g/dL      Albumin 4 3 g/dL      Total Bilirubin 0 38 mg/dL      eGFR 93 ml/min/1 73sq m     Narrative:       [facility] guidelines for Chronic Kidney Disease (CKD):     Stage 1 with normal or high GFR (GFR > 90 mL/min/1 73 square meters)    Stage 2 Mild CKD (GFR = 60-89 mL/min/1 73 square meters)    Stage 3A Moderate CKD (GFR = 45-59 mL/min/1 73 square meters)    Stage 3B Moderate CKD (GFR = 30-44 mL/min/1 73 square meters)    Stage 4 Severe CKD (GFR = 15-29 mL/min/1 73 square meters)    Stage 5 End Stage CKD (GFR <15 mL/min/1 73 square meters)  Note: GFR calculation is accurate only with a steady state creatinine    Troponin I [[id number]]  (Normal) Collected:  10/15/19 1935    Lab Status: Final result Specimen:  Blood from Arm, Left Updated:  10/15/19 2004     Troponin I <0 02 ng/mL     CBC and differential [[id number]]  (Abnormal) Collected:  10/15/19 1935    Lab Status:  Final result Specimen:  Blood from Arm, Left Updated:  10/15/19 1943     WBC 9 96 Thousand/uL      RBC 5 00 Million/uL      Hemoglobin 14 6 g/dL      Hematocrit 43 6 %      MCV 87 fL      MCH 29 2 pg      MCHC 33 5 g/dL      RDW 12 5 %      MPV 9 3 fL      Platelets 696 Thousands/uL      nRBC 0 /100 WBCs      Neutrophils Relative 76 %      Immat GRANS % 0 %      Lymphocytes Relative 16 %      Monocytes Relative 8 %      Eosinophils Relative 0 %      Basophils Relative 0 %      Neutrophils Absolute 7 53 Thousands/µL      Immature Grans Absolute 0 03 Thousand/uL      Lymphocytes Absolute 1 54 Thousands/µL      Monocytes Absolute 0 81 Thousand/µL      Eosinophils Absolute 0 03 Thousand/µL      Basophils Absolute 0 02 Thousands/µL                  XR chest 2 views   ED Interpretation by Mc Griffin MD (10/15 2033)   No acute cardiopulmonary abnormality      MRI inpatient order    (Results Pending)         Procedures  ECG 12 Lead Documentation Only  Date/Time: 10/15/2019 8:47 PM  Performed by: Mc Griffin MD  Authorized by: Mc Griffin MD     ECG reviewed by me, the ED Provider: yes    Patient location:  ED  Previous ECG:     Previous ECG:  Unavailable    Comparison to cardiac monitor: No    Interpretation:     Interpretation: normal    Rate:     ECG rate:  83    ECG rate assessment: normal    Rhythm:     Rhythm: sinus rhythm    Ectopy:     Ectopy: none    QRS:     QRS axis:  Normal    QRS intervals:  Normal  Conduction:     Conduction: normal    ST segments:     ST segments:  Normal  T waves:     T waves: normal    Comments:      Incomplete RBBB same as previous            ED Course  ED Course as of Oct 15 2323   Tue Oct 15, 2019   2032 Troponin I: <0 02   2033 Blood Pressure: 154/81   2033 Temperature: 98 7 °F (37 1 °C)   2033 Pulse: 87   2033 Respirations: 18   2033 SpO2: 95 %   2033   Vital signs stable  HEART Risk Score      Most Recent Value   History  1 Filed at: 10/15/2019 2050   ECG  0 Filed at: 10/15/2019 2050   Age  1 Filed at: 10/15/2019 2050   Risk Factors  0 Filed at: 10/15/2019 2050   Troponin  0 Filed at: 10/15/2019 2050   Heart Score Risk Calculator   History  1 Filed at: 10/15/2019 2050   ECG  0 Filed at: 10/15/2019 2050   Age  1 Filed at: 10/15/2019 2050   Risk Factors  0 Filed at: 10/15/2019 2050   Troponin  0 Filed at: 10/15/2019 2050   HEART Score  2 Filed at: 10/15/2019 2050   HEART Score  2 Filed at: 10/15/2019 2050                            MDM  Number of Diagnoses or Management Options  Chest pain, unspecified type: established and worsening  Diagnosis management comments: Chest Pain Life Threats: PE, Esophageal Rupture, Tension Pneumothorax, MI, Aortic Dissection, Cardiac Tamponade    Workup:   EKG/Chest X-ray (2 views)  Troponin  CBC  (  )POCUS     Treatment:   (x)Aspirin   (   )Nitroglycerin (if SBP >100 and have an IV) and not R sided MI on EKG  (   )Morphine if pain is not controlled  (x)GI cocktail     Decision Making Tools:  Heart score is    My Differential: ACS, Musculoskeletal, GERD, anxiety, Other diagnoses discussed but unspecified here  Patient's vitals are stable and their pain is now well controlled after GI cocktail  ACS more likely with radiation to R arm side, WILDER, and diaphoresis  This patient denies any dyspnea on exertion or diaphoresis but does have some left to right radiation  Likely cured given history of GERD and despite patient's treatment with omeprazole 40 mg and follow up GI, he stills has some gastric pain  He will receive a GI cocktail  His EKG looks similar to previous and his troponin is not elevated  PE less likely given lack of pleuritic chest pain   Esophageal rupture, pneumothorax, aortic dissection, cardiac tamponade less likely given history and negative X-ray, lack of hypotension and vital signs stable  Could be an early atypical presentation of one of these, but all of these have been considered  Given his recent presentation for near-syncope with visual disturbances and has multiple emergency visits for similar chest pain, will admit for observation and further workup  SDM: Patient and his brother agreeable to the plan for observation and further workup  All of the patient's questions were answered  Dispo: Patient to be admitted for low risk chest pain ruleout and further workup of his near syncope         Amount and/or Complexity of Data Reviewed  Clinical lab tests: ordered and reviewed  Tests in the radiology section of CPT®: ordered and reviewed  Tests in the medicine section of CPT®: ordered and reviewed  Discussion of test results with the performing providers: yes  Decide to obtain previous medical records or to obtain history from someone other than the patient: yes  Obtain history from someone other than the patient: yes  Review and summarize past medical records: yes  Independent visualization of images, tracings, or specimens: yes    Risk of Complications, Morbidity, and/or Mortality  Presenting problems: low  Diagnostic procedures: low  Management options: low    Patient Progress  Patient progress: stable      Disposition  Final diagnoses:   Chest pain, unspecified type     Time reflects when diagnosis was documented in both MDM as applicable and the Disposition within this note     Time User Action Codes Description Comment    10/15/2019  8:39 PM Baker Payor Add [R07 9] Chest pain, unspecified type     10/15/2019 10:10 PM Mariea [place] Add [R20 8] Burning sensation     10/15/2019 10:15 PM Mariea [place] Add [H53 9] Visual disturbance       ED Disposition     ED Disposition Condition Date/Time Comment    Admit Stable Tue Oct 15, 2019  9:39 PM Case was discussed with DESHAWN and the patient's admission status was agreed to be Admission Status: observation status to the service of Dr Jacki Vivas   Follow-up Information    None         Current Discharge Medication List      CONTINUE these medications which have NOT CHANGED    Details   escitalopram (LEXAPRO) 20 mg tablet Take 20 mg by mouth daily      omeprazole (PriLOSEC) 20 mg delayed release capsule Take 40 mg by mouth daily            No discharge procedures on file  ED Provider  Attending physically available and evaluated Cindy High I managed the patient along with the ED Attending      Electronically Signed by         Roxy Gates MD  10/15/19 6938

## 2019-10-16 NOTE — H&P
H&P- Louise Salines 1970, 54 y o  male MRN: [id number]    Unit/Bed#: CW2 215-01 Encounter: [id number]    Primary Care Provider: Chioma Danielson MD   Date and time admitted to hospital: 10/15/2019  7:30 PM        * Burning sensation  Assessment & Plan  Burning sensation of bilateral upper arms starts at the left side radiating towards the mid chest and then radiation towards the right side  Intermittent and not provoked by any action  Supplemented  This may be related to radiculopathy secondary to cervical degenerative disc disease as proven by MRI last month  Would like Neurology consult order to better assess this condition as it is causing patient more anxiety  Neuro checks every 4 hours for 24 hours  Psychophysiological insomnia  Assessment & Plan  Continue Restoril 15 mg at night  Stress and adjustment reaction  Assessment & Plan  Currently patient is experiencing multiple causes for increased stress and adjustment reaction  Would need case management  Will leave to day team whether they would like to get a formal psychiatric consult  But will continue with Lexapro  Will also place patient on Restoril to be able to get enough rest and sleep  Severe anxiety with panic  Assessment & Plan  Currently placed on Lexapro as out patient, will continue  Patient also needs vital rest and sleep  Will leave to day team if they would like to get a formal psychiatric consult  Visual disturbance  Assessment & Plan  Patient is due to be seen by Ophthalmology as out patient; with current complaints, will place ophthalmological consult for formal ophthalmological exam     Intermittent palpitations  Assessment & Plan  Will place patient on telemetry for 24 hours to look for any fatal arrhythmia      Degenerative disc disease, cervical  Assessment & Plan  As patient has significant findings in MRI with degenerative disc disease, most likely this is the cause of his burning sensation bilateral arms; will get Neurology  Doubt however whether the patient would need any other intervention (such as NS)  GERD (gastroesophageal reflux disease)  Assessment & Plan  Will continue with omeprazole/pantoprazole  Will also place patient on Mylanta as needed  Zofran for nausea  VTE Prophylaxis: Enoxaparin (Lovenox)  / sequential compression device   Code Status: Level 1 - Full Code as discussed with patient  POLST: There is no POLST form on file for this patient (pre-hospital)    Anticipated Length of Stay:  Patient will be admitted on an Observation basis with an anticipated length of stay of  less than 2 midnights  Justification for Hospital Stay: Please see detailed plans noted above  Chief Complaint:     Multiple complaints involving palpitations, visual field cuts, bilateral burning sensation of upper arms  History of Present Illness:  Martina Castillo is a 54 y o  male who has a past medical history significant for gastroesophageal reflux disease  Unfortunately, the patient also has been experiencing increasing stress in his life dealing with the disability of his wife being paralyzed from the waist down  Patient's wife currently is at home disabled, on a wheelchair and trying to take care of therefore 2 young children  Likewise, his father just recently  late September  Essentially, the patient has been dealing with financial problems brought about by the Disability of his wife along well with the daily work that needs to be done with 4 children and family  That said, the patient is unable to says get a full night's rest probably sleeping 4 hours a night  Patient comes in due to complaints of bilateral upper arm burning sensation that starts at the left to radiate towards the chest and radiating towards the right arm  He denies any dropping of things  He denies any fever or cough or cold  Once in a while even without doing anything he would claim to lightheadedness and dizziness    He has not fallen  No accidents as of yet  He works as a  with his brother who is part of the family company  Likewise, patient claims intermittent visual field cuts however currently has full vision  A that said, the patient has been seeing emergency room for the past 2 weeks intermittently approximately 3 visits  Currently troponin has been within normal limits less than 0 02 and ER telemetry has been unremarkable as well  Currently, patient is not toxic  However with seemingly anxious stance and somewhat hurried and worried speech  Patient denies any chest pain  No abdominal pain  No nausea or vomiting  No GI symptoms  Positive for insomnia  Review of Systems:    Constitutional:  Denies fever or chills   Eyes:  Denies change in visual acuity   HENT:  Denies nasal congestion or sore throat   Respiratory:  Denies cough or shortness of breath   Cardiovascular:  Denies chest pain or edema   GI:  Denies abdominal pain, nausea, vomiting, bloody stools or diarrhea   :  Denies dysuria   Musculoskeletal:  Denies back pain or joint pain   Integument:  Denies rash   Neurologic:  Denies headache, as patient claims no focal weakness however sensory disturbances as noted above  He has not been dropping things  Endocrine:  Denies polyuria or polydipsia   Lymphatic:  Denies swollen glands   Psychiatric:  Anxious     Past Medical and Surgical History:   Past Medical History:   Diagnosis Date    GERD (gastroesophageal reflux disease)      History reviewed  No pertinent surgical history  Meds/Allergies:  Medications Prior to Admission   Medication    escitalopram (LEXAPRO) 20 mg tablet    omeprazole (PriLOSEC) 20 mg delayed release capsule       Allergies:    Allergies   Allergen Reactions    Tetanus-Diphth-Acell Pertussis [Tetanus-Diphth-Acell Pertussis]      Pain       History:  Marital Status: /Civil Union   Occupation:   and works with his brother  Patient Pre-hospital Living Situation:  Lives at home with 4 kids  Wife is disabled  Patient claims also that he takes care of her welfare, he does her bowel preparation daily  This is in addition to a full-time job and taking care of the children  Patient Pre-hospital Level of Mobility:  Ambulatory  Patient Pre-hospital Diet Restrictions:  Regular diet  Substance Use History:   Social History     Substance and Sexual Activity   Alcohol Use No     Social History     Tobacco Use   Smoking Status Never Smoker   Smokeless Tobacco Never Used     Social History     Substance and Sexual Activity   Drug Use No       Family History:  History reviewed  No pertinent family history  Physical Exam:     Vitals:   Blood Pressure: 122/61 (10/15/19 2130)  Pulse: 68 (10/15/19 2130)  Temperature: 98 7 °F (37 1 °C) (10/15/19 1930)  Temp Source: Oral (10/15/19 1930)  Respirations: 18 (10/15/19 2130)  Weight - Scale: 81 6 kg (179 lb 14 3 oz) (10/15/19 1930)  SpO2: 96 % (10/15/19 2130)    Constitutional:  Well developed, well nourished, no acute distress, non-toxic appearance but anxious  Eyes:  PERRL, conjunctiva normal   HENT:  Atraumatic, external ears normal, nose normal, oropharynx moist, no pharyngeal exudates  Neck- normal range of motion, no tenderness, supple   Respiratory:  No respiratory distress, normal breath sounds, no rales, no wheezing   Cardiovascular:  Normal rate, normal rhythm, no murmurs, no gallops, no rubs   GI:  Soft, nondistended, normal bowel sounds, nontender, no organomegaly, no mass, no rebound, no guarding   :  No costovertebral angle tenderness   Musculoskeletal:  No edema, no tenderness, no deformities   Back- no tenderness  Integument:  Well hydrated, no rash   Lymphatic:  No lymphadenopathy noted   Neurologic:  Alert &awake, communicative, CN 2-12 normal, normal motor function, normal sensory function, no focal deficits noted   Psychiatric:  Speech and behavior worried      Lab Results: I have personally reviewed pertinent reports  Results from last 7 days   Lab Units 10/15/19  1935   WBC Thousand/uL 9 96   HEMOGLOBIN g/dL 14 6   HEMATOCRIT % 43 6   PLATELETS Thousands/uL 185   NEUTROS PCT % 76*   LYMPHS PCT % 16   MONOS PCT % 8   EOS PCT % 0     Results from last 7 days   Lab Units 10/15/19  1935   POTASSIUM mmol/L 4 0   CHLORIDE mmol/L 109*   CO2 mmol/L 24   BUN mg/dL 18   CREATININE mg/dL 0 97   CALCIUM mg/dL 9 3   ALK PHOS U/L 68   ALT U/L 25   AST U/L 8           EKG:  Normal sinus rhythm, RBBB    Imaging: I have personally reviewed pertinent reports  Ct Head Without Contrast    Result Date: 10/12/2019  Narrative: CT BRAIN - WITHOUT CONTRAST INDICATION:   Headache, chronic, with new features  COMPARISON:  None  TECHNIQUE:  CT examination of the brain was performed  In addition to axial images, coronal 2D reformatted images were created and submitted for interpretation  Radiation dose length product (DLP) for this visit:  967 mGy-cm   This examination, like all CT scans performed in the [facility], was performed utilizing techniques to minimize radiation dose exposure, including the use of iterative reconstruction and automated exposure control  IMAGE QUALITY:  Diagnostic  FINDINGS: PARENCHYMA:  No intracranial mass, mass effect or midline shift  No CT signs of acute infarction  No acute parenchymal hemorrhage  VENTRICLES AND EXTRA-AXIAL SPACES:  Normal for the patient's age  VISUALIZED ORBITS AND PARANASAL SINUSES:  Unremarkable  Leftward nasal septal deviation  Right middle turbinate latoya bullosa  CALVARIUM AND EXTRACRANIAL SOFT TISSUES:  Normal      Impression: No acute intracranial abnormality  Workstation performed: [id number]         ** Please Note: Dragon 360 Dictation voice to text software was used in the creation of this document   **

## 2019-10-16 NOTE — ASSESSMENT & PLAN NOTE
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

## 2019-10-16 NOTE — PROGRESS NOTES
[facility]    Progress Note - Omid Garcia 1970, 54 y o  male MRN: [id number]    Unit/Bed#: CW2 215-01 Encounter: [id number]    Primary Care Provider: José Luis Romero MD   Date and time admitted to hospital: 10/15/2019  7:30 PM    * Burning sensation  Assessment & Plan  · Patient presents with acute on chronic bilateral upper extremity paresthesias  These have been present intermittently over the past 15 years  He has previously followed with [facility] and received injections" with no benefit the last 1 being 10 years ago  · Over the past 2 weeks these paresthesias had become worse  He has been to the ER 3 times over the past several weeks  · CT head this admission negative  · MRI cervical spine done in August 2019 showed 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  There is moderate left foraminal narrowing at C3-4  No cord compression or cord signal abnormality  · Symptoms seem radicular in nature  Neurology with MS on differential however low suspicion for this  · Will start Gabapentin 100 mg PO TID, can d/c patient on 300 mg QHS  This would also help with sleep  · MRI of brain pending  · Anxiety and lack of sleep may also be contributing/magnifying symptoms  Degenerative disc disease, cervical  Assessment & Plan  · With suspected radiculopathy  · Gabapentin as above  · Consider outpatient evaluation with neurosurgery or spine and pain  GERD (gastroesophageal reflux disease)  Assessment & Plan  · PPI    Intermittent palpitations  Assessment & Plan  · Patient denies ever having palpitation for me  Telemetry review without any arrhythmia  Will d/c  Psychophysiological insomnia  Assessment & Plan  · Started on Restoril on admission QHS  Would probably not continue at discharge  Will add melatonin       Severe anxiety with panic  Assessment & Plan  · Situational anxiety given the fact his wife is paralyzed and requires a lot of care, recent death of his father and he is primary caregiver for 4 children  · Recently started on Lexapro, will continue  Visual disturbance  Assessment & Plan  · Seen in the ED for visual disturbance with associated HA  Patient was given migraine cocktail in symptoms improved  He does report a history of migraines  No further complaints of any visual disturbance  · He is due to see Ophthalmology as an outpatient tomorrow      Pharmacologic: Enoxaparin (Lovenox)  Mechanical VTE Prophylaxis in Place: Yes    Patient Centered Rounds: I have performed bedside rounds with nursing staff today  Discussions with Specialists or Other Care Team Provider: nursing, neurology     Education and Discussions with Family / Patient: patient     Time Spent for Care: 45 minutes  More than 50% of total time spent on counseling and coordination of care as described above  Current Length of Stay: 0 day(s)    Current Patient Status: Observation   Certification Statement: addtional neurology recommendations, MRI of brain    Discharge Plan / Estimated Discharge Date: await MRI of brain       Code Status: Level 1 - Full Code      Subjective:   Patient reports ongoing paresthesias of upper extremities at times extending to chest and lower abdomen ongoing for about 15 years  This has progressively worsened over the past 2 weeks  Patient has also been under a great deal of stress over the past 2 weeks secondary to the death of his father, as well as caregiver burden of his wife who is paralyzed and 4 children  He works a full-time job and has not been sleeping much  He also reported several ED visits 1 of which was secondary to visual disturbance which was thought to be secondary to migraine and resolved with migraine cocktail  He is due to see an ophthalmologist as an outpatient tomorrow as well as have a carotid duplex study done as an outpatient as per his PCP on Friday      Objective:     Vitals: Temp (24hrs), Av 5 °F (36 9 °C), Min:97 9 °F (36 6 °C), Max:99 °F (37 2 °C)    Temp:  [97 9 °F (36 6 °C)-99 °F (37 2 °C)] 99 °F (37 2 °C)  HR:  [68-87] 84  Resp:  [18] 18  BP: (121-154)/(61-81) 121/71  SpO2:  [95 %-99 %] 98 %  Body mass index is 27 37 kg/m²  Input and Output Summary (last 24 hours):     No intake or output data in the 24 hours ending 10/16/19 0948    Physical Exam:     Physical Exam   Constitutional: He is oriented to person, place, and time  No distress  HENT:   Head: Normocephalic  Eyes: Conjunctivae are normal    Neck: Normal range of motion  Cardiovascular: Normal rate  Pulmonary/Chest: Breath sounds normal    Abdominal: Bowel sounds are normal    Musculoskeletal: Normal range of motion  Neurological: He is alert and oriented to person, place, and time  Skin: Skin is warm and dry  Psychiatric: He has a normal mood and affect  Nursing note and vitals reviewed        Additional Data:     Labs:    Results from last 7 days   Lab Units 10/15/19  1935   WBC Thousand/uL 9 96   HEMOGLOBIN g/dL 14 6   HEMATOCRIT % 43 6   PLATELETS Thousands/uL 185   NEUTROS PCT % 76*   LYMPHS PCT % 16   MONOS PCT % 8   EOS PCT % 0     Results from last 7 days   Lab Units 10/15/19  1935   POTASSIUM mmol/L 4 0   CHLORIDE mmol/L 109*   CO2 mmol/L 24   BUN mg/dL 18   CREATININE mg/dL 0 97   CALCIUM mg/dL 9 3   ALK PHOS U/L 68   ALT U/L 25   AST U/L 8             Recent Cultures (last 7 days):           Last 24 Hours Medication List:     Current Facility-Administered Medications:  acetaminophen 650 mg Oral Q6H PRN Jeannette Graham MD   aluminum-magnesium hydroxide-simethicone 30 mL Oral Q6H PRN Jeannette Graham MD   docusate sodium 100 mg Oral BID PRN Jeannette Graham MD   enoxaparin 40 mg Subcutaneous Daily Jeannette Graham MD   escitalopram 20 mg Oral Daily Jeannette Graham MD   gabapentin 100 mg Oral TID LEE Chew   LORazepam 0 5 mg Intravenous Q4H PRN Jeannette Graham MD   melatonin 6 mg Oral HS LEE Chew   ondansetron 4 mg Intravenous Q6H PRN Renan Hernandez MD   pantoprazole 40 mg Oral Early Morning Renan Hernandez MD   temazepam 15 mg Oral HS PRN Renan Hernandez MD        Today, Patient Was Seen By: LEE Velasquez    ** Please Note: Dragon 360 Dictation voice to text software may have been used in the creation of this document   **

## 2019-10-16 NOTE — ED ATTENDING ATTESTATION
10/15/2019  IJosie MD, saw and evaluated the patient  I have discussed the patient with the resident/non-physician practitioner and agree with the resident's/non-physician practitioner's findings, Plan of Care, and MDM as documented in the resident's/non-physician practitioner's note, except where noted  All available labs and Radiology studies were reviewed  I was present for key portions of any procedure(s) performed by the resident/non-physician practitioner and I was immediately available to provide assistance  At this point I agree with the current assessment done in the Emergency Department  I have conducted an independent evaluation of this patient a history and physical is as follows:      61-year-old man presenting with 4 hours of intermittent burning chest pain radiating to bilateral arms which started at rest   No associated shortness of breath  There is some epigastric abdominal pain as well  Patient does note an episode 3 days ago where he had near syncope with loss of vision in his right eye and headache  He has not had these symptoms today  Patient does have a history of GERD but states the pain today is different than his typical GERD symptoms  On exam he is awake and alert no acute distress  Heart regular rate rhythm, no murmurs rubs or gallops  Lungs clear to auscultation bilaterally  Abdomen soft, nontender, nondistended  Nonfocal neuro  No significant acute findings on labs, EKG, imaging  However given the patient's intermittent chest discomfort which is not consistent with his typical GERD as well as his near syncope and neurologic symptoms 3 days ago will admit him for observation and further workup      ED Course         Critical Care Time  Procedures

## 2019-10-16 NOTE — CONSULTS
Consultation - Neurology   Elgin Gallegos 54 y o  male MRN: [id number]  Unit/Bed#: CW2 215-01 Encounter: [id number]      Assessment/Plan   Assessment:  55 yo male with PMH of GERD and prolonged history of upper extremities burning sensation now worsening associated with right visual loss on R field  in setting of recent C MRI with C3-4 through C5-6 spondylitis changes without compression of the cord  # Burning sensation   - Concern for neuromyelitis optica vs cervical radiculopathy vs Multiple sclerosis  - MRI C spine in 8/2019 C3-4 and C5-6 spondylitic changes, associated with neck pain and visual loss on right side  Neuro exam without focal deficit, noted vitiligo and eye exam grossly no focal deficit  Plan:  1  Agree with MRI/MRA brain W and WO checking for demyelinating and DDD  2  NMO IgG antibodies   3  Gabapentin 300 mg Q HS  4  Follow up with neurology 2 to 4 weeks will set up appointment  5  [id number] [address] for MRI outpatient    History of Present Illness     Reason for Consult / Principal Problem: Burning sensation in upper extremities  Hx and PE limited by: None  HPI: Elgin Gallegos is a 54 y o   male who presents with PMH of GERD and recent anxiety presented with burning sensation in his upper extremities one day prior to admission and neurology consulted to help with recommendations  He has been through extensive stress in his life after he lost his father in 9/2019 and his wife is now disable paralyzed in [facility]  He has been having burning sensation started in his flexor surface of left arm then in his flexor right arm radiates across his chest  He had it for years and was evaluated by MRI C spine in 8/2019 showed spondylitic changes C3-C4 through C5-C6  He was advised to get carotid duplex by his PCP which is scheduled this Friday  He had steroid injections in his neck without improvement  He had no weakness in his arms or hands   He associated loss of vision for 20 minutes on the right side 4 days ago as well as in [address]  He also added numbness of his tongue and left part of his face in the past that also resolved spontaneously  He has been trying Lexapro for an anxiety recently  His symptoms are associated with headache without numbness or tingling sensation otherwise  He denies nausea, vomiting, sore throat fever or chills  Inpatient consult to Neurology     Performed by  Carla Avelar MD     Authorized by Beatrice Doty MD              Review of Systems   Constitutional: Negative for chills and fever  HENT: Negative for rhinorrhea and sore throat  Eyes: Positive for visual disturbance  Loss of half  right side vision   Respiratory: Positive for cough  Negative for shortness of breath  Cardiovascular: Positive for chest pain  Negative for palpitations  Gastrointestinal: Negative for nausea and vomiting  Endocrine: Negative for polydipsia and polyphagia  Genitourinary: Negative for difficulty urinating and dysuria  Musculoskeletal: Positive for neck pain  Negative for gait problem  Skin: Positive for color change  Worsening vitiligo   Neurological: Positive for numbness and headaches  Negative for weakness  Burning sensation of upper extremities  Psychiatric/Behavioral: Positive for dysphoric mood and sleep disturbance (decrease sleep)  Historical Information   Past Medical History:   Diagnosis Date    GERD (gastroesophageal reflux disease)      History reviewed  No pertinent surgical history  Social History   Social History     Substance and Sexual Activity   Alcohol Use No     Social History     Substance and Sexual Activity   Drug Use No     Social History     Tobacco Use   Smoking Status Never Smoker   Smokeless Tobacco Never Used     Family History: Sister with breast cancer, father with diabetes and mother with MI  Review of previous medical records was  completed       Meds/Allergies   all current active meds have been reviewed    Allergies Allergen Reactions    Tetanus-Diphth-Acell Pertussis [Tetanus-Diphth-Acell Pertussis]      Pain         Objective   Vitals:Blood pressure 121/71, pulse 84, temperature 99 °F (37 2 °C), resp  rate 18, height 5' 8" (1 727 m), weight 81 6 kg (180 lb), SpO2 98 %  ,Body mass index is 27 37 kg/m²  No intake or output data in the 24 hours ending 10/16/19 0745    Invasive Devices: Invasive Devices     Peripheral Intravenous Line            Peripheral IV 10/15/19 Left Antecubital less than 1 day                Physical Exam   Constitutional: He is oriented to person, place, and time  No distress  HENT:   Head: Normocephalic and atraumatic  Eyes: Pupils are equal, round, and reactive to light  EOM are normal  Right eye exhibits no discharge  Left eye exhibits no discharge  No scleral icterus  Neck: Normal range of motion  Neck supple  Cardiovascular: Normal rate, regular rhythm and normal heart sounds  Exam reveals no friction rub  No murmur heard  Pulmonary/Chest: Effort normal and breath sounds normal  No stridor  No respiratory distress  He has no wheezes  He has no rales  Abdominal: Soft  Bowel sounds are normal  He exhibits no distension and no mass  There is no tenderness  There is no guarding  Neurological: He is oriented to person, place, and time  Skin: Skin is dry  He is not diaphoretic  Vitiligo in dorsal hands and extensor knees, as well as angle of mouth and perioricular  Psychiatric: His speech is normal      Neurologic Exam     Mental Status   Oriented to person, place, and time  Speech: speech is normal   Level of consciousness: alert    Cranial Nerves   Cranial nerves II through XII intact  CN III, IV, VI   Pupils are equal, round, and reactive to light    Extraocular motions are normal      Motor Exam   Muscle bulk: normal  Right arm tone: normal  Left arm tone: normal    Strength   Right neck flexion: 5/5  Left neck flexion: 5/5  Right neck extension: 5/5  Left neck extension: 5/5  Right deltoid: 5/5  Left deltoid: 5/5  Right biceps: 5/5  Left biceps: 5/5  Right triceps: 5/5  Left triceps: 5/5  Right wrist flexion: 5/5  Left wrist flexion: 5/5  Right wrist extension: 5/5  Left wrist extension: 55  Right interossei: 5/5  Left interossei: 5/5  Right abdominals: 5/5  Left abdominals: 5/5  Right iliopsoas: 5/5  Left iliopsoas: 5/5  Right quadriceps: 5/5  Left quadriceps: 5/5  Right hamstrin/5  Left hamstrin5  Right glutei: 55  Left glutei: 55  Right anterior tibial: 5  Left anterior tibial:   Right posterior tibial:   Left posterior tibial:   Right peroneal:   Left peroneal: 5  Right gastroc: 5  Left gastroc:     Sensory Exam   Light touch normal    Right arm light touch: normal  Left arm light touch: normal      Lab Results:   I have personally reviewed pertinent reports  , CBC:   Results from last 7 days   Lab Units 10/15/19  1935 10/12/19  1341   WBC Thousand/uL 9 96 9 58   RBC Million/uL 5 00 5 40   HEMOGLOBIN g/dL 14 6 16 0   HEMATOCRIT % 43 6 46 8   MCV fL 87 87   PLATELETS Thousands/uL 185 189   , BMP/CMP:   Results from last 7 days   Lab Units 10/15/19  1935 10/12/19  1341   SODIUM mmol/L 139 142   POTASSIUM mmol/L 4 0 3 9   CHLORIDE mmol/L 109* 109*   CO2 mmol/L 24 28   BUN mg/dL 18 12   CREATININE mg/dL 0 97 0 98   CALCIUM mg/dL 9 3 9 5   AST U/L 8  --    ALT U/L 25  --    ALK PHOS U/L 68  --    EGFR ml/min/1 73sq m 93 91   , TSH:   Results from last 7 days   Lab Units 10/16/19  0532   TSH 3RD GENERATON uIU/mL 1 140   , Lipid Profile:   Results from last 7 days   Lab Units 10/16/19  0532   HDL mg/dL 39*   LDL CALC mg/dL 130*   TRIGLYCERIDES mg/dL 44     Imaging Studies: I have personally reviewed pertinent reports  EKG, Pathology, and Other Studies: I have personally reviewed pertinent reports      VTE Prophylaxis: Enoxaparin (Lovenox)    Code Status: Level 1 - Full Code  Advance Directive and Living Will:      Power of :    POLST: Counseling / Coordination of Care  Total time spent today 40 minutes  Greater than 50% of total time was spent with the patient and / or family counseling and / or coordination of care  A description of the counseling / coordination of care: Jesús Cooley MD  Available on [employer]  THE [facility]  Internal medicine resident

## 2019-10-16 NOTE — ASSESSMENT & PLAN NOTE
· Seen in the ED for visual disturbance with associated HA  Patient was given migraine cocktail in symptoms improved  He does report a history of migraines  No further complaints of any visual disturbance    · He is due to see Ophthalmology as an outpatient tomorrow

## 2019-10-16 NOTE — ASSESSMENT & PLAN NOTE
· Situational anxiety given the fact his wife is paralyzed and requires a lot of care, recent death of his father and he is primary caregiver for 4 children  · Recently started on Lexapro, will continue

## 2019-10-16 NOTE — UTILIZATION REVIEW
Initial Clinical Review    Admission: Date/Time/Statement:  10/15/19 2140  Orders Placed This Encounter   Procedures    Place in Observation     Standing Status:   Standing     Number of Occurrences:   1     Order Specific Question:   Admitting Physician     Answer:   Kiara Munoz [[id number]]     Order Specific Question:   Level of Care     Answer:   Med Surg [[id number]]     ED Arrival Information     Expected Arrival Acuity Means of Arrival Escorted By Service Admission Type    - 10/15/2019 19:23 Emergent Walk-In Family Member General Medicine Emergency    Arrival Complaint    b/l arm pain, CP        Chief Complaint   Patient presents with    Chest Pain     pt c/o chest pain/pressure that radiates down b/l arms  pt also c/o nausea  denies sob  Assessment/Plan:    54year old male presents to ed from home for evaluation and treatment of burning chest pain radiating from left to right across the chest   Severity  Of pain  9/10  He was seen 10-12 -19  in ed for syncope  Admit to family stressors with spouse on disability, children to support and recent death in family  On arrival  Ekg shows incomplete R BBB, chest x ray  With no acute finding,  Troponin wnl  Previous Ct reviewed shows no acute findings  Treated in ed with aspirin and GI cocktail  Of carrafate and viscous lidocaine  Plan to admit to observation for  Burning chest pain  Plan to consult neurology as previous MRI shows cervical degenerative disc disease which may cause radiculopathy, consider psychiatric consult for stress management, monitor on telemetry to rule out arrhythmia, trend troponin with EKG x3, obtain neuro checks q4 hr, start neurontin      Obtain MRI MRI of head and neck and c spine and  NMB IgG autoantibodies     ED Triage Vitals [10/15/19 1930]   Temperature Pulse Respirations Blood Pressure SpO2   98 7 °F (37 1 °C) 87 18 154/81 95 %      Oral            Pain Score       7       10/15/19 81 6 kg (180 lb)     Additional Vital Signs:    Date/Time  Temp  Pulse  Resp  BP  MAP (mmHg)  SpO2  O2 Device  Patient Position - Orthostatic VS   10/16/19 11:01:49    80    124/72  89  97 %       10/16/19 07:04:52  99 °F (37 2 °C)  84    121/71  88  98 %       10/16/19 03:13:26    73    134/76  95  98 %       10/15/19 22:31:15  97 9 °F (36 6 °C)  70  18  142/79  100  97 %       10/15/19 2130    68  18  122/61    96 %  None (Room air)  Sitting   10/15/19 2045    70  18  139/65    99 %  None (Room air)  Sitting         Pertinent Labs/Diagnostic Test Results:     CT brain  No acute finding  Chest  Xray  No acute finding    ECG rate:  83    ECG rate assessment: normal    Rhythm:     Rhythm: sinus rhythm    Ectopy:     Ectopy: none    QRS:     QRS axis:  Normal    QRS intervals:  Normal  Conduction:     Conduction: normal    ST segments:     ST segments:  Normal  T waves:     T waves: normal    Comments:      Incomplete RBBB same as previous         Results from last 7 days   Lab Units 10/15/19  1935 10/12/19  1341   WBC Thousand/uL 9 96 9 58   HEMOGLOBIN g/dL 14 6 16 0   HEMATOCRIT % 43 6 46 8   PLATELETS Thousands/uL 185 189   NEUTROS ABS Thousands/µL 7 53 7 48         Results from last 7 days   Lab Units 10/15/19  1935 10/12/19  1341   SODIUM mmol/L 139 142   POTASSIUM mmol/L 4 0 3 9   CHLORIDE mmol/L 109* 109*   CO2 mmol/L 24 28   ANION GAP mmol/L 6 5   BUN mg/dL 18 12   CREATININE mg/dL 0 97 0 98   EGFR ml/min/1 73sq m 93 91   CALCIUM mg/dL 9 3 9 5     Results from last 7 days   Lab Units 10/15/19  1935   AST U/L 8   ALT U/L 25   ALK PHOS U/L 68   TOTAL PROTEIN g/dL 7 5   ALBUMIN g/dL 4 3   TOTAL BILIRUBIN mg/dL 0 38         Results from last 7 days   Lab Units 10/15/19  1935 10/12/19  1341   GLUCOSE RANDOM mg/dL 112 117         Results from last 7 days   Lab Units 10/16/19  0133 10/15/19  2248 10/15/19  1935   TROPONIN I ng/mL <0 02 <0 02 <0 02             Results from last 7 days   Lab Units 10/16/19  0532   TSH 3RD GENERATON uIU/mL 1 140       Results from last 7 days   Lab Units 10/15/19  2336   CLARITY UA  Clear   COLOR UA  Yellow   SPEC GRAV UA  1 023   PH UA  5 0   GLUCOSE UA mg/dl Negative   KETONES UA mg/dl 15 (1+)*   BLOOD UA  Negative   PROTEIN UA mg/dl Negative   NITRITE UA  Negative   BILIRUBIN UA  Negative   UROBILINOGEN UA E U /dl 0 2   LEUKOCYTES UA  Negative         ED Treatment:   Medication Administration from 10/15/2019 1923 to 10/15/2019 2223       Date/Time Order Dose Route     10/15/2019 2045 aspirin tablet 325 mg 325 mg Oral     10/15/2019 2045 Lidocaine Viscous HCl (XYLOCAINE) 2 % mucosal solution 15 mL 15 mL Swish & Swallow     10/15/2019 2045 sucralfate (CARAFATE) oral suspension 1,000 mg 1,000 mg Oral        Past Medical History:   Diagnosis Date    GERD (gastroesophageal reflux disease)      Present on Admission:  **None**      Admitting Diagnosis:     Burning sensation [R20 8]  Visual disturbance [H53 9]  Chest pain, unspecified type [R07 9]    Age/Sex: 54 y o  male     Admission Orders:    Medications:  enoxaparin 40 mg Subcutaneous Daily   escitalopram 20 mg Oral Daily   gabapentin 100 mg Oral TID   melatonin 6 mg Oral HS   pantoprazole 40 mg Oral Early Morning          acetaminophen 650 mg Oral Q6H PRN   aluminum-magnesium hydroxide-simethicone 30 mL Oral Q6H PRN   docusate sodium 100 mg Oral BID PRN   LORazepam 0 5 mg Intravenous Q4H PRN   ondansetron 4 mg Intravenous Q6H PRN   temazepam 15 mg Oral HS PRN       IP CONSULT TO NEUROLOGY  IP CONSULT TO CASE MANAGEMENT    Network Utilization Review Department  Phone: [phone]; Fax [phone]  [email or web address]  ATTENTION: Please call with any questions or concerns to [phone]  and carefully listen to the prompts so that you are directed to the right person  Send all requests for admission clinical reviews, approved or denied determinations and any other requests to fax [phone]   All voicemails are confidential

## 2019-10-16 NOTE — ASSESSMENT & PLAN NOTE
Currently placed on Lexapro as out patient, will continue  Patient also needs vital rest and sleep  Will leave to day team if they would like to get a formal psychiatric consult

## 2019-10-16 NOTE — ASSESSMENT & PLAN NOTE
Will continue with omeprazole/pantoprazole  Will also place patient on Mylanta as needed  Zofran for nausea

## 2019-10-16 NOTE — PLAN OF CARE
Problem: PAIN - ADULT  Goal: Verbalizes/displays adequate comfort level or baseline comfort level  Description  Interventions:  - Encourage patient to monitor pain and request assistance  - Assess pain using appropriate pain scale  - Administer analgesics based on type and severity of pain and evaluate response  - Implement non-pharmacological measures as appropriate and evaluate response  - Consider cultural and social influences on pain and pain management  - Notify physician/advanced practitioner if interventions unsuccessful or patient reports new pain  Outcome: Progressing     Problem: SAFETY ADULT  Goal: Patient will remain free of falls  Description  INTERVENTIONS:  - Assess patient frequently for physical needs  -  Identify cognitive and physical deficits and behaviors that affect risk of falls    -  [place] fall precautions as indicated by assessment   - Educate patient/family on patient safety including physical limitations  - Instruct patient to call for assistance with activity based on assessment  - Modify environment to reduce risk of injury  - Consider OT/PT consult to assist with strengthening/mobility  Outcome: Progressing  Goal: Maintain or return to baseline ADL function  Description  INTERVENTIONS:  -  Assess patient's ability to carry out ADLs; assess patient's baseline for ADL function and identify physical deficits which impact ability to perform ADLs (bathing, care of mouth/teeth, toileting, grooming, dressing, etc )  - Assess/evaluate cause of self-care deficits   - Assess range of motion  - Assess patient's mobility; develop plan if impaired  - Assess patient's need for assistive devices and provide as appropriate  - Encourage maximum independence but intervene and supervise when necessary  - Involve family in performance of ADLs  - Assess for home care needs following discharge   - Consider OT consult to assist with ADL evaluation and planning for discharge  - Provide patient education as appropriate  Outcome: Progressing  Goal: Maintain or return mobility status to optimal level  Description  INTERVENTIONS:  - Assess patient's baseline mobility status (ambulation, transfers, stairs, etc )    - Identify cognitive and physical deficits and behaviors that affect mobility  - Identify mobility aids required to assist with transfers and/or ambulation (gait belt, sit-to-stand, lift, walker, cane, etc )  - [place] fall precautions as indicated by assessment  - Record patient progress and toleration of activity level on Mobility SBAR; progress patient to next Phase/Stage  - Instruct patient to call for assistance with activity based on assessment  - Consider rehabilitation consult to assist with strengthening/weightbearing, etc   Outcome: Progressing     Problem: DISCHARGE PLANNING  Goal: Discharge to home or other facility with appropriate resources  Description  INTERVENTIONS:  - Identify barriers to discharge w/patient and caregiver  - Arrange for needed discharge resources and transportation as appropriate  - Identify discharge learning needs (meds, wound care, etc )  - Arrange for interpretive services to assist at discharge as needed  - Refer to Case Management Department for coordinating discharge planning if the patient needs post-hospital services based on physician/advanced practitioner order or complex needs related to functional status, cognitive ability, or social support system  Outcome: Progressing     Problem: Knowledge Deficit  Goal: Patient/family/caregiver demonstrates understanding of disease process, treatment plan, medications, and discharge instructions  Description  Complete learning assessment and assess knowledge base    Interventions:  - Provide teaching at level of understanding  - Provide teaching via preferred learning methods  Outcome: Progressing     Problem: NEUROSENSORY - ADULT  Goal: Achieves stable or improved neurological status  Description  INTERVENTIONS  - Monitor and report changes in neurological status  - Monitor vital signs such as temperature, blood pressure, glucose, and any other labs ordered   - Initiate measures to prevent increased intracranial pressure  - Monitor for seizure activity and implement precautions if appropriate      Outcome: Progressing     Problem: CARDIOVASCULAR - ADULT  Goal: Maintains optimal cardiac output and hemodynamic stability  Description  INTERVENTIONS:  - Monitor I/O, vital signs and rhythm  - Monitor for S/S and trends of decreased cardiac output  - Administer and titrate ordered vasoactive medications to optimize hemodynamic stability  - Assess quality of pulses, skin color and temperature  - Assess for signs of decreased coronary artery perfusion  - Instruct patient to report change in severity of symptoms  Outcome: Progressing  Goal: Absence of cardiac dysrhythmias or at baseline rhythm  Description  INTERVENTIONS:  - Continuous cardiac monitoring, vital signs, obtain 12 lead EKG if ordered  - Administer antiarrhythmic and heart rate control medications as ordered  - Monitor electrolytes and administer replacement therapy as ordered  Outcome: Progressing

## 2019-10-17 VITALS
SYSTOLIC BLOOD PRESSURE: 144 MMHG | BODY MASS INDEX: 27.28 KG/M2 | OXYGEN SATURATION: 97 % | HEART RATE: 87 BPM | WEIGHT: 180 LBS | DIASTOLIC BLOOD PRESSURE: 79 MMHG | HEIGHT: 68 IN | RESPIRATION RATE: 18 BRPM | TEMPERATURE: 97.8 F

## 2019-10-17 PROBLEM — R20.8 BURNING SENSATION: Status: RESOLVED | Noted: 2019-10-15 | Resolved: 2019-10-17

## 2019-10-17 PROBLEM — H53.9 VISUAL DISTURBANCE: Status: RESOLVED | Noted: 2019-10-15 | Resolved: 2019-10-17

## 2019-10-17 PROBLEM — R00.2 INTERMITTENT PALPITATIONS: Status: RESOLVED | Noted: 2019-10-15 | Resolved: 2019-10-17

## 2019-10-17 LAB
ATRIAL RATE: 73 BPM
P AXIS: 68 DEGREES
PR INTERVAL: 152 MS
QRS AXIS: -68 DEGREES
QRSD INTERVAL: 98 MS
QT INTERVAL: 412 MS
QTC INTERVAL: 453 MS
T WAVE AXIS: 16 DEGREES
VENTRICULAR RATE: 73 BPM

## 2019-10-17 PROCEDURE — 99213 OFFICE O/P EST LOW 20 MIN: CPT | Performed by: PSYCHIATRY & NEUROLOGY

## 2019-10-17 PROCEDURE — 93010 ELECTROCARDIOGRAM REPORT: CPT | Performed by: INTERNAL MEDICINE

## 2019-10-17 PROCEDURE — 99217 PR OBSERVATION CARE DISCHARGE MANAGEMENT: CPT | Performed by: PHYSICIAN ASSISTANT

## 2019-10-17 RX ORDER — GABAPENTIN 100 MG/1
100 CAPSULE ORAL 3 TIMES DAILY
Qty: 90 CAPSULE | Refills: 0 | Status: SHIPPED | OUTPATIENT
Start: 2019-10-17

## 2019-10-17 RX ORDER — LORAZEPAM 0.5 MG/1
0.5 TABLET ORAL EVERY 8 HOURS PRN
Qty: 15 TABLET | Refills: 0 | Status: SHIPPED | OUTPATIENT
Start: 2019-10-17 | End: 2019-10-27

## 2019-10-17 RX ADMIN — ESCITALOPRAM OXALATE 20 MG: 20 TABLET ORAL at 09:31

## 2019-10-17 RX ADMIN — GABAPENTIN 100 MG: 100 CAPSULE ORAL at 09:31

## 2019-10-17 RX ADMIN — PANTOPRAZOLE SODIUM 40 MG: 40 TABLET, DELAYED RELEASE ORAL at 06:30

## 2019-10-17 NOTE — ASSESSMENT & PLAN NOTE
· Situational anxiety given the fact his wife is paralyzed and requires a lot of care, recent death of his father and he is primary caregiver for his children  · Recently started on Lexapro, will continue  · Will add Ativan 0 5 mg every 8 hours PRN anxiety  Will prescribe #15 tablets at discharge

## 2019-10-17 NOTE — ASSESSMENT & PLAN NOTE
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

## 2019-10-17 NOTE — PROGRESS NOTES
Progress Note - Neurology   Kennedi Vann 54 y o  male MRN: [id number]  Unit/Bed#: CW2 215-01 Encounter: [id number]    Assessment:  55 yo male with PMH of GERD and prolonged history of upper extremities burning sensation now worsening associated with right visual loss on R field  in setting of recent C MRI with C3-4 through C5-6 spondylitis changes without compression of the cord  # Burning sensation   - Concern for neuromyelitis optica vs cervical radiculopathy vs Multiple sclerosis  - MRI C spine in 8/2019 C3-4 and C5-6 spondylitic changes, associated with neck pain and visual loss on right side  Neuro exam without focal deficit, noted vitiligo and eye exam grossly no focal deficit  Plan:    1  Agree with MRI/MRA brain W and WO checking for demyelinating and DDD  2  NMO IgG antibodies   3  Gabapentin 300 mg Q HS  4  Follow up with neurology 2 to 4 weeks will set up appointment  5  [id number] [address] for MRI outpatient     Subjective:   Patient seen and examined at bedside  No acute events occurred overnight  His still has burning in his upper extremities 4/7 specially with movement otherwise no complaint  He is anxious to go home to take care of his wife who is being sick  Vitals: Blood pressure 144/79, pulse 87, temperature 97 8 °F (36 6 °C), temperature source Oral, resp  rate 18, height 5' 8" (1 727 m), weight 81 6 kg (180 lb), SpO2 97 %  ,Body mass index is 27 37 kg/m²      Physical Exam: /79 (BP Location: Right arm)   Pulse 87   Temp 97 8 °F (36 6 °C) (Oral)   Resp 18   Ht 5' 8" (1 727 m)   Wt 81 6 kg (180 lb)   SpO2 97%   BMI 27 37 kg/m²   General appearance: alert and oriented, in no acute distress  Eyes: Extraocular muscle intact, PERRLA  Lungs: clear to auscultation bilaterally  Heart: regular rate and rhythm, S1, S2 normal, no murmur, click, rub or gallop  Abdomen: Soft, nontender, nondistended, hypoactive bowel sounds  Skin: Vitiligo in the extensor surface of the hand and extensor of the knees as well as periorbital and at the angle of the mouth  Neurologic:  Alert and oriented  Fluent speech  Strength grossly normal bilaterally, sensation grossly normal bilaterally, deep tender reflexes increased at the patellar bilateral     Lab, Imaging and other studies: I have personally reviewed pertinent reports  VTE Prophylaxis: Enoxaparin (Lovenox)    Counseling / Coordination of Care  Total time spent today 20 minutes  Greater than 50% of total time was spent with the patient and / or family counseling and / or coordination of care  A description of the counseling / coordination of care: Sarwat Sherman MD  Available on [employer]  [facility]  Internal medicine resident

## 2019-10-17 NOTE — DISCHARGE SUMMARY
Discharge- Martina Castillo 1970, 54 y o  male MRN: [id number]  Unit/Bed#: Constance Horne 215-01 Encounter: [id number]  Primary Care Provider: Uvaldo Barros MD   Date and time admitted to hospital: 10/15/2019  7:30 PM    * Burning sensationresolved as of 10/17/2019  Assessment & Plan  · Patient presents with acute on chronic bilateral upper extremity paresthesias  These have been present intermittently over the past 15 years  He has previously followed with [facility] and received injections" with no benefit the last 1 being 10 years ago  · Over the past 2 weeks these paresthesias had become worse  He has been to the ER 3 times over the past several weeks  · CT head this admission negative  · MRI cervical spine without contrast done in August 2019 showed 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  There is moderate left foraminal narrowing at C3-4  No cord compression or cord signal abnormality  · Started on Gabapentin 100 mg PO TID, which we will continue at discharge  · MRI of brain w/wo contrast and MRI of cervical spine with contrast as an out-patient  MRIs have been scheduled for the patient on Wednesday, October 23, 2019 @ 10:30 at [facility]  · Anxiety and lack of sleep may also be contributing/magnifying symptoms  Degenerative disc disease, cervical  Assessment & Plan  · 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  There is moderate left foraminal narrowing at C3-4  No cord compression or cord signal abnormality  GERD (gastroesophageal reflux disease)  Assessment & Plan  · PPI    Stress and adjustment reaction  Assessment & Plan  · Currently patient is experiencing multiple causes for increased stress and adjustment reaction  Started on Lexapro        Severe anxiety with panic  Assessment & Plan  · Situational anxiety given the fact his wife is paralyzed and requires a lot of care, recent death of his father and he is primary caregiver for his children  · Recently started on Lexapro, will continue  · Will add Ativan 0 5 mg every 8 hours PRN anxiety  Will prescribe #15 tablets at discharge  Visual disturbanceresolved as of 10/17/2019  Assessment & Plan  · He is due to see Ophthalmology today  Discharging Physician / Practitioner: Hasmukh Yuen PA-C  PCP: Kelly Mathew MD  Admission Date:   Admission Orders (From admission, onward)     Ordered        10/15/19 2140  Place in Observation  Once                   Discharge Date: 10/17/19    Resolved Problems  Date Reviewed: 10/17/2019          Resolved    Intermittent palpitations 10/17/2019     Resolved by  Hasmukh Yuen PA-C    Visual disturbance 10/17/2019     Resolved by  Hasmukh Yuen PA-C    * (Principal) Burning sensation 10/17/2019     Resolved by  Hasmukh Yuen PA-C        Consultations During Hospital Stay:  · Neurology    Procedures Performed:   · Chest x-ray (10/15/2019):  No acute cardiopulmonary disease  Significant Findings / Test Results:   · None    Incidental Findings:   · None     Test Results Pending at Discharge (will require follow up): · None     Outpatient Tests Requested:  · MRI brain w/wo contrast  · MRI cervical spine w/wo contrast    Complications:  None    Reason for Admission: [address] Course:     Adonis Pop is a 54 y o  male patient who originally presented to the hospital on 10/15/2019 due to paresthesias starting in the left hand radiating proximally across the anterior chest and into the right upper extremity  He is also complaining of a blind spot in his right eye which is fleeting  He has occasional twitching in his muscles  He has occasional headaches  His symptoms seem to be exaggerated by underlying stress but cannot rule out an organic etiology at this time    Unfortunately, the patient was unable to get an MRI during his hospitalization although this is recommended by Neurology who did evaluate the patient  I was able to secure an outpatient MRI appointment for the patient next week at [facility]  He also should schedule an appointment with Neurology as outpatient to review these results in 2 weeks  The patient is scheduled to see an ophthalmologist later today and he will follow through with this appointment  He also is scheduled for carotid Doppler tomorrow as an outpatient which he should complete  Please see above list of diagnoses and related plan for additional information  Condition at Discharge: stable     Discharge Day Visit / Exam:     Subjective: On the day of discharge the patient is reporting some mild blurred vision after taking gabapentin  This clears up within an hour so  He has random twitching in his muscles  He has random paresthesias in his arms  He had a mild headache  All his symptoms were very fleeting  Vitals: Blood Pressure: 144/79 (10/17/19 0710)  Pulse: 87 (10/17/19 0710)  Temperature: 97 8 °F (36 6 °C) (10/17/19 0710)  Temp Source: Oral (10/17/19 0710)  Respirations: 18 (10/17/19 0710)  Height: 5' 8" (172 7 cm) (10/15/19 2231)  Weight - Scale: 81 6 kg (180 lb) (10/15/19 2231)  SpO2: 97 % (10/17/19 0710)  Exam:   Physical Exam   HENT:   Head: Normocephalic and atraumatic  Mouth/Throat: Oropharynx is clear and moist and mucous membranes are normal    Eyes: No scleral icterus  Cardiovascular: Normal rate and regular rhythm  No murmur heard  Pulmonary/Chest: Breath sounds normal  He has no wheezes  He has no rales  He exhibits no tenderness  Abdominal: Soft  Bowel sounds are normal  He exhibits no distension  There is no tenderness  Musculoskeletal: Normal range of motion  He exhibits no edema  Skin: Skin is warm and dry  No rash noted  Psychiatric: He has a normal mood and affect  Vitals reviewed      Discussion with Family:  Patient's brother    Discharge instructions/Information to patient and family:   See after visit summary for information provided to patient and family  Provisions for Follow-Up Care:  See after visit summary for information related to follow-up care and any pertinent home health orders  Disposition:     Home    For Discharges to [address] [facility]:   · Not Applicable to this Patient - Not Applicable to this Patient    Planned Readmission:  No     Discharge Statement:  I spent 45 minutes discharging the patient  This time was spent on the day of discharge  I had direct contact with the patient on the day of discharge  Greater than 50% of the total time was spent examining patient, answering all patient questions, arranging and discussing plan of care with patient as well as directly providing post-discharge instructions  Additional time then spent on discharge activities  Discharge Medications:  See after visit summary for reconciled discharge medications provided to patient and family        ** Please Note: This note has been constructed using a voice recognition system **

## 2019-10-17 NOTE — ASSESSMENT & PLAN NOTE
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

## 2019-10-17 NOTE — UTILIZATION REVIEW
Continued Stay Review    Date: 10-17-19                         Current Patient Class: observation Current Level of Care: medical surgical     HPI:49 y o  male initially admitted on 10-15-19   2140  For burning chest and arm pain  Patient with known cervical spondylosis  Has visual complaints what wee self limited without findings on exam, however  Patient does exhibit  Pathological reflexes  Assessment/Plan:    Neurology consult completed  Plan to start gabapentin and obtain brain and c spine imaging  Last evening patient experienced recurrent burning  Chest and bilateral arm  pain when he received a distressing phone call  EKG performed  He also reports bilateral lower extremity  New onset fatigue  # Burning sensation   - Concern for neuromyelitis optica vs cervical radiculopathy vs Multiple sclerosis  - MRI C spine in 8/2019 C3-4 and C5-6 spondylitic changes, associated with neck pain and visual loss on right side  Neuro exam without focal deficit, noted vitiligo and eye exam grossly no focal deficit       Plan:  1  Agree with MRI/MRA brain W and WO checking for demyelinating and DDD  2  NMO IgG antibodies   3  Gabapentin 300 mg Q HS  4   Follow up with neurology 2 to 4 weeks will set up appointment      Pertinent Labs/Diagnostic Results:   Results from last 7 days   Lab Units 10/15/19  1935 10/12/19  1341   WBC Thousand/uL 9 96 9 58   HEMOGLOBIN g/dL 14 6 16 0   HEMATOCRIT % 43 6 46 8   PLATELETS Thousands/uL 185 189   NEUTROS ABS Thousands/µL 7 53 7 48         Results from last 7 days   Lab Units 10/15/19  1935 10/12/19  1341   SODIUM mmol/L 139 142   POTASSIUM mmol/L 4 0 3 9   CHLORIDE mmol/L 109* 109*   CO2 mmol/L 24 28   ANION GAP mmol/L 6 5   BUN mg/dL 18 12   CREATININE mg/dL 0 97 0 98   EGFR ml/min/1 73sq m 93 91   CALCIUM mg/dL 9 3 9 5     Results from last 7 days   Lab Units 10/15/19  1935   AST U/L 8   ALT U/L 25   ALK PHOS U/L 68   TOTAL PROTEIN g/dL 7 5   ALBUMIN g/dL 4 3   TOTAL BILIRUBIN mg/dL 0 38         Results from last 7 days   Lab Units 10/15/19  1935 10/12/19  1341   GLUCOSE RANDOM mg/dL 112 117       Results from last 7 days   Lab Units 10/16/19  0133 10/15/19  2248 10/15/19  1935   TROPONIN I ng/mL <0 02 <0 02 <0 02       Results from last 7 days   Lab Units 10/16/19  0532   TSH 3RD GENERATON uIU/mL 1 140     Results from last 7 days   Lab Units 10/15/19  2336   CLARITY UA  Clear   COLOR UA  Yellow   SPEC GRAV UA  1 023   PH UA  5 0   GLUCOSE UA mg/dl Negative   KETONES UA mg/dl 15 (1+)*   BLOOD UA  Negative   PROTEIN UA mg/dl Negative   NITRITE UA  Negative   BILIRUBIN UA  Negative   UROBILINOGEN UA E U /dl 0 2   LEUKOCYTES UA  Negative     Vital Signs    Date/Time  Temp  Pulse  Resp  BP  MAP (mmHg)  SpO2  O2 Device  Patient Position - Orthostatic VS   10/17/19 07:10:36  97 8 °F (36 6 °C)  87  18  144/79  101  97 %  None (Room air)  Lying       Medications:     Medications:  enoxaparin 40 mg Subcutaneous Daily   escitalopram 20 mg Oral Daily   gabapentin 100 mg Oral TID   melatonin 6 mg Oral HS   pantoprazole 40 mg Oral Early Morning          acetaminophen 650 mg Oral Q6H PRN   aluminum-magnesium hydroxide-simethicone 30 mL Oral Q6H PRN   docusate sodium 100 mg Oral BID PRN   LORazepam 0 5 mg Intravenous Q4H PRN   ondansetron 4 mg Intravenous Q6H PRN   temazepam 15 mg Oral HS PRN       Discharge Plan: to be determined    Network Utilization Review Department  Phone: [phone]; Fax [phone]  [email or web address]  ATTENTION: Please call with any questions or concerns to [phone]  and carefully listen to the prompts so that you are directed to the right person  Send all requests for admission clinical reviews, approved or denied determinations and any other requests to fax [phone]   All voicemails are confidential

## 2019-10-17 NOTE — PROGRESS NOTES
PCA went in to perform VS  Pt reported that he had just received a distressing phone call and experienced a momentary recurrence of the burning sensation across his chest and bilateral arms  VS WDL, though pt noted that BP was higher than his usual     Neuro assessment performed  Appeared to be WDL, though pt reports new onset B/L fatigue in legs  Per nursing communication to obtain EKG with any chest pain, EKG performed  DESHAWN Anna) made aware

## 2019-10-17 NOTE — ASSESSMENT & PLAN NOTE
· Currently patient is experiencing multiple causes for increased stress and adjustment reaction  Started on Lexapro

## 2019-10-17 NOTE — SOCIAL WORK
AI spoke with [facility] who would like pt to have an OP MRI scheduled but not having success with the scheduling process  AI notified [phone] [facility], Areatha Schilder who suggested DESHAWN call Patrick Molina CM obtained [facility] contact ext # [phone] and provided to [facility]

## 2019-10-18 ENCOUNTER — HOSPITAL ENCOUNTER (OUTPATIENT)
Dept: NON INVASIVE DIAGNOSTICS | Facility: HOSPITAL | Age: 49
Discharge: HOME | End: 2019-10-18
Payer: COMMERCIAL

## 2019-10-18 DIAGNOSIS — I25.119 CORONARY ARTERY DISEASE WITH ANGINA PECTORIS, UNSPECIFIED VESSEL OR LESION TYPE, UNSPECIFIED WHETHER NATIVE OR TRANSPLANTED HEART (HCC): ICD-10-CM

## 2019-10-18 DIAGNOSIS — I65.21 STENOSIS OF RIGHT CAROTID ARTERY: ICD-10-CM

## 2019-10-18 LAB — AQP4 H2O CHANNEL AB SERPL IA-ACNC: <1.5 U/ML (ref 0–3)

## 2019-10-18 PROCEDURE — 93880 EXTRACRANIAL BILAT STUDY: CPT | Performed by: INTERNAL MEDICINE

## 2019-10-18 PROCEDURE — 93880 EXTRACRANIAL BILAT STUDY: CPT

## 2019-10-22 ENCOUNTER — TELEPHONE (OUTPATIENT)
Dept: NEUROLOGY | Facility: CLINIC | Age: 49
End: 2019-10-22

## 2019-10-22 NOTE — TELEPHONE ENCOUNTER
Reviewed chart, patient seen inpatient by Dr Leonid Whatley and the teaching service  Staff message sent to Dr Leonid Whatley to advise on follow up

## 2019-10-22 NOTE — TELEPHONE ENCOUNTER
Patient called in  States that he was at [place] [address] in [place] from [address]  Was ordered a brain MRI to be done in the hosptial  He states it was cancelled and set up as an outpatient, [facility]  Got a message today that his MRI was denied  Call made to Varsha (prior auth), she states per the note, MRI ordered by internal medicine  It was denied and they did a peer to peer and denial still upheld  Needs to be seen in our office first and then we can order MRI and submit our notes  Patient has appointment set up on 1/2/20 with Dr Kirstie Bass to have been done as a referral through ED and not as a hosptial f/u appointment  Call made to Danielle Webster (ext [phone]) and left message to see if she can get patient in for a hosptial f/u sooner than 1/2/20  Gave patients name and number  Patient is upset that he was held an extra day in the hospital to get the MRI done and it wasn't  Now he will be charged an extra bed day for no additonal testing  Advised that the neurology office did not dictate when he was discharged from the hospital, he was under the care of internal medicine  Patient disagrees, states that it was a neuorologist that wanted to keep him  Unsure of his name  Advised that he can contact patient advocate in the hospital to see if there are any suggestions he has  Patient agreeable to call  Will task for 2 days to see if Danielle Webster was able to get patient in sooner than 1/2/20

## 2019-10-26 ENCOUNTER — HOSPITAL ENCOUNTER (OUTPATIENT)
Dept: MRI IMAGING | Facility: HOSPITAL | Age: 49
Discharge: HOME | End: 2019-10-26
Payer: COMMERCIAL

## 2019-10-26 DIAGNOSIS — H53.9 VISUAL DISTURBANCE: ICD-10-CM

## 2019-10-26 PROCEDURE — A9585 GADOBUTROL INJECTION: HCPCS | Performed by: RADIOLOGY

## 2019-10-26 PROCEDURE — 70553 MRI BRAIN STEM W/O & W/DYE: CPT

## 2019-10-26 RX ADMIN — GADOBUTROL 7 ML: 604.72 INJECTION INTRAVENOUS at 12:39

## 2019-10-30 NOTE — TELEPHONE ENCOUNTER
Dr Kiersten Ro, I did received contact from Dr Marcos Fairchild   Would you happen to know what the recommendations are?

## 2019-10-31 NOTE — TELEPHONE ENCOUNTER
Alen Hamilton,    Was not sure which recommendations you were looking for    In regard to discharge plan or getting the MRI  Diego Salas ?

## 2019-11-04 NOTE — TELEPHONE ENCOUNTER
Received message from Dr Sepideh Erazo  Pasted below:   MD Candice Webster, NICOLETTE Spivey,     This amanda can see general neurology, it does not need to be a vascular neurologist        Thanks! Jessica Hernandez please assist with scheduling  Thank you!

## 2019-11-04 NOTE — TELEPHONE ENCOUNTER
Spoke with [facility] via over the phone  States next appointment with general neurology is also in January  Dr Roark Brunner, can you please advise on MRI  Thanks

## 2019-11-07 ENCOUNTER — TELEPHONE (OUTPATIENT)
Dept: NEUROLOGY | Facility: CLINIC | Age: 49
End: 2019-11-07

## 2019-11-07 DIAGNOSIS — D49.2 NERVE SHEATH TUMOR: Primary | ICD-10-CM

## 2019-11-07 NOTE — TELEPHONE ENCOUNTER
Roque Laughter and Clinical team:  I called Isaac Padron and reviewed the MRI results with him  I would suggest that we should get him in to be seen in the next 2 weeks  He can see any headache/general/or stroke attending or A/P  Merari Epperson, can I ask you to look at the MRI for this amanda? 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 Thanks!

## 2019-11-07 NOTE — TELEPHONE ENCOUNTER
Called patient, provided him appointment information  Answered all his questions  Provided directions  No further questions or concerns at this time

## 2019-11-07 NOTE — TELEPHONE ENCOUNTER
Reviewed schedules  [place] has an opening 11/11 at 11:00 am      Called patient  Scheduled 11/11  States he does not have paper, fax, or mychart  Patient asked I call him back at 2 pm with appointment info  Will follow up  Called bautista Jimenez message regarding appointment

## 2019-11-08 NOTE — TELEPHONE ENCOUNTER
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

## 2019-11-10 NOTE — PROGRESS NOTES
Patient ID: Omid Garcia is a 54 y o  male  Assessment/Plan:    Dysesthesias/ vision loss  - patient with acute on chronic bilateral upper extremity paresthesias, these have been present intermittently over the past 15 years  Patient had been in the ER several times in the past several weeks  Patient recently presented to the hospital with complaints of visual field loss, slight headache, feeling funky  Stroke workup unremarkable, patient does have chronic neck pain  Symptoms felt possibly related to a typical headache associated with increased stress and anxiety  Pt has not had any repeat episodes  Patient has history of migraines in the past occurring approximately 2 per year however this was not like his typical past migraine headaches  --   CT head negative  -- prior MRI cervical spine without contrast August 2019 showed spondylitic degenerative disease particularly C3-4 through C5-6  Moderate left foraminal narrowing noted, no cord compression  No evidence of cord pathology  --  Patient started on gabapentin 100 mg t i d  At this time, has no symptomatology, can attempt to titrate off medication  --  MRI brain  without evidence of demyelination  Initial concern regarding possible Demylienating disease, NM0 was negative          Cervical degenerative disc disease  -  Patient seen by [facility] previously received injections with no benefit, last injection being over 10 years ago  -- MRI cervical spondylitic degenerative disease noted particularly C3 through C6, no cord compression  -- no clinical evidence of myelopathy  Should  Patient had increased neck pain, or radicular complaints, he can follow-up with ortho or consider returning for pain management     right IAC mass  --   Incidental finding MRI brain There is a small enhancing mass identified within the right internal auditory canal with enhancement extending along the 7th nerve as it extends to the geniculate ganglion    The enhancing mass within the IAC measures 6 mm and likely represents nerve   sheath tumor  Patient does describe having long term issues with tinnitus on the right, as well as decreased hearing  -- patient following with ENT, currently trying conservative measures with a hearing aid  -- will continue with serial MRIs  -- consider neurosurgery evaluation    No problem-specific Assessment & Plan notes found for this encounter  Diagnoses and all orders for this visit:    Nerve sheath tumor  -     Cancel: MRI brain IAC wo and w contrast; Future  -     BUN; Future  -     Creatinine, serum; Future  -     MRI brain IAC wo and w contrast; Future    Loss of part of visual field    Severe anxiety with panic    Degenerative disc disease, cervical           Subjective:    HPI   Roberto Gandhi  Is a 51-year-old male who presented to [facility] on October 15 secondary to complaints of  a blind spot in the right eye,this occurred 4 days prior, and lasted approximately 20 minutes  Patient with history of dysesthesias occurring in his right upper extremity, radiating across his chest   Cardiac workup has been unremarkable  Patient recently seen by his PCP for those complaints, did undergo MRI of the cervical spine  According to the patient, he noted sudden loss of part of his visual field,  he could only read parts of a word  he is unclear if it occurred on the right or the left  It only lasted approximately 20 minutes  He did note having a slight frontal headache following this episode which lasted a few hours  He did take Advil at the time  The next day, he was mildly nauseous, He states he felt "funky"  He called his PCP, he was sent to the ER,  Labs were unremarkable, he underwent CT H which was unrevealing  He was treated with Toradol and Reglan  His symptoms had subsided and he was subsequently discharged  2 days later, patient was at work, once again he felt funny    At that point he reported back to the ER    He had occasional twitching in his muscles  Patient did note being under excessive stress, he lost his father September 2019 and his wife is now disabled paralyzed in a wheelchair  He has been trying Lexapro for anxiety  Patient seen by neurology, noted to have hyperreflexia  Symptoms were longstanding and stable  Patient had recent MRI cervical spine August 2019 with evidence of C3- C6 spondylitic changes associated with neck pain and vision loss on the right side  Neuro exam without focal deficits, eye exam grossly unrevealing  Patient was started on gabapentin  Unfortunately, patient needed to return home to care for his disabled wife  He was asked to obtain outpatient follow-up MRI, as well as to see Ophthalmology  He did undergo labs, NMO antibodies were negative  Patient was stable, he was subsequently discharged  today,  Fortunately, he has not  had any further episodes of vision loss  In the interim, he did undergo  Carotid ultrasound, less than 50% stenosis noted on the right, no arterial disease noted in the left  He underwent MRI of the brain with and without contrast,  Evidence of small enhancing mass identified within the right IAC extending along the 7th nerve as it extends to the geniculate ganglion  This felt likely representing a nerve sheath tumor  Remainder of the brain parenchyma was unremarkable  Patient did note hearing loss and tinnitus  which has been present for several  years  Given findings, 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, could also be neuritis  No intervention at this point however would really monitor in 3 months time  Patient was seen by ENT underwent an audiogram, moderate to severe mixed hearing loss noted on the right with poor WRS, left ear with moderate high frequency sensory neural hearing loss, excellent WRS    All of his options were reviewed with him to include operative intervention however, he opted to pursue a hearing aid 1st to see how he does,  He is awaiting insurance approval   He denies any new weakness, no difficulty swallowing or chewing  He does relate having a history of migraines which usually occur approximately twice a year associated with nausea, his jaw locks up, he has photophobia  This past episode was atypical    He denies having had any significant headaches in the interim  He does note chronic issues with mild cervical discomfort  He denies any current dysesthesia  He is on gabapentin 100 mg 1 tablet 3 times a day  patient does admit to ongoing anxiety, he is on Lexapro, tolerating well  He has access to Xanax if needed  He is following up with his PCP           MRI brain with without contrast 10/26/2019;   There is a small enhancing mass identified within the right internal auditory canal with enhancement extending along the 7th nerve as it extends to the geniculate ganglion  The enhancing mass within the IAC measures 6 mm and likely represents nerve   sheath tumor  Correlate for 7th or 8th cranial nerve neuropathy  Neurosurgical follow-up recommended      The remainder of the brain parenchyma is unremarkable  No etiology for patient's single episode of visual disturbance  VAS carotid 10/18/2019;  RIGHT:There is <50% stenosis noted in the internal carotid artery  Plaque is  homogenous and smooth  Vertebral artery flow is antegrade  There is no significant subclavian artery disease  LEFT:  There is no evidence of arterial disease throughout the extracranial carotid  system  Vertebral artery flow is antegrade    There is no significant subclavian artery disease     labs 10/16/2019; NMO IgG  Antibodies negative, CH = 178,  TG = 44, HDL = 39, LDL = 130, TSH= 1 14    The following portions of the patient's history were reviewed and updated as appropriate: allergies, current medications, past family history, past medical history, past social history, past surgical history and problem list  Objective:  Current Outpatient Medications   Medication Sig Dispense Refill    escitalopram (LEXAPRO) 20 mg tablet Take 20 mg by mouth daily      gabapentin (NEURONTIN) 100 mg capsule Take 1 capsule (100 mg total) by mouth 3 (three) times a day 90 capsule 0    omeprazole (PriLOSEC) 20 mg delayed release capsule Take 40 mg by mouth daily       LORazepam (ATIVAN) 0 5 mg tablet Take 1 tablet (0 5 mg total) by mouth every 8 (eight) hours as needed for anxiety for up to 10 days 15 tablet 0     No current facility-administered medications for this visit  Blood pressure 134/75, pulse 79, height 5' 8" (1 727 m), weight 81 2 kg (179 lb)  Neurological Exam  Mental Status  Awake, alert and oriented to person, place and time  Speech is normal  Language is fluent with no aphasia  Cranial Nerves  CN II: Visual fields full to confrontation  CN III, IV, VI: Extraocular movements intact bilaterally  Pupils equal round and reactive to light bilaterally  CN V: Facial sensation is normal   CN VII: Full and symmetric facial movement  CN VIII:  Right: Hearing is decreased  CN XI: Shoulder shrug strength is normal   CN XII: Tongue midline without atrophy or fasciculations  Motor  Normal muscle bulk throughout  Normal muscle tone  Strength is 5/5 throughout all four extremities  Sensory  Sensation is intact to light touch, pinprick, vibration and proprioception in all four extremities  Reflexes                                           Right                      Left  Brachioradialis                    2+                         2+  Triceps                                2+                         2+  Patellar                                2+                         2+  Achilles                                3+                         3+    Right pathological reflexes: Crossed adductor absent  Left pathological reflexes: Crossed adductor absent      Coordination  Finger-to-nose, rapid alternating movements and heel-to-shin normal bilaterally without dysmetria  Gait  Normal casual, toe, heel and tandem gait  ROS:  ROS reviewed personally updated with patient today's visit    Review of Systems  Constitutional: Negative  Negative for appetite change and fever  HENT: Positive for ear pain (ringing in ear)  Negative for hearing loss (right side), tinnitus, trouble swallowing and voice change  Eyes: Negative  Negative for photophobia and pain  Respiratory: Negative  Negative for shortness of breath  Cardiovascular: Negative  Negative for palpitations  Gastrointestinal: Negative  Negative for nausea and vomiting  Endocrine: Negative  Negative for cold intolerance and heat intolerance  Genitourinary: Negative  Negative for dysuria, frequency and urgency  Musculoskeletal: Negative  Negative for myalgias and neck pain  Skin: Negative  Negative for rash  Neurological: Negative  Negative for dizziness, tremors, seizures, syncope, facial asymmetry, speech difficulty, weakness, light-headedness, numbness and headaches  Hematological: Negative  Does not bruise/bleed easily  Psychiatric/Behavioral: Negative  Negative for confusion, hallucinations and sleep disturbance

## 2019-11-11 ENCOUNTER — OFFICE VISIT (OUTPATIENT)
Dept: NEUROLOGY | Facility: CLINIC | Age: 49
End: 2019-11-11
Payer: COMMERCIAL

## 2019-11-11 VITALS
HEART RATE: 79 BPM | DIASTOLIC BLOOD PRESSURE: 75 MMHG | HEIGHT: 68 IN | WEIGHT: 179 LBS | SYSTOLIC BLOOD PRESSURE: 134 MMHG | BODY MASS INDEX: 27.13 KG/M2

## 2019-11-11 DIAGNOSIS — H53.40 LOSS OF PART OF VISUAL FIELD: ICD-10-CM

## 2019-11-11 DIAGNOSIS — M50.30 DEGENERATIVE DISC DISEASE, CERVICAL: ICD-10-CM

## 2019-11-11 DIAGNOSIS — D49.2 NERVE SHEATH TUMOR: Primary | ICD-10-CM

## 2019-11-11 DIAGNOSIS — F41.0 SEVERE ANXIETY WITH PANIC: ICD-10-CM

## 2019-11-11 PROCEDURE — 99215 OFFICE O/P EST HI 40 MIN: CPT | Performed by: NURSE PRACTITIONER

## 2019-11-11 NOTE — PATIENT INSTRUCTIONS
Patient clinically stable  Will obtain repeat MRI brain/IAC's in January  Patient to continue on Lexapro  Ativan as needed  Can attempt to decrease gabapentin by 1 tablet a week if increased numbness or tingling, can return to same dose  Following with PCP  Following with ENT

## 2019-11-11 NOTE — PROGRESS NOTES
Assessment/Plan:      There are no diagnoses linked to this encounter  Subjective:     Patient ID: Titus Horn is a 54 y o  male      HPI    Review of Systems      Objective:     Physical Exam

## 2019-11-11 NOTE — PROGRESS NOTES
Patient ID: Sindy Rodriguez is a 54 y o  male  Assessment/Plan:    No problem-specific Assessment & Plan notes found for this encounter  {Assess/PlanSmartLinks:55292}       Subjective:    HPI    {[facility] HPI texts:75484}    {Common ambulatory SmartLinks:50360}         Objective:    Blood pressure 134/75, pulse 79, height 5' 8" (1 727 m), weight 81 2 kg (179 lb)  Physical Exam    Neurological Exam      ROS:    Review of Systems   Constitutional: Negative  Negative for appetite change and fever  HENT: Positive for ear pain (ringing in ear)  Negative for hearing loss (right side), tinnitus, trouble swallowing and voice change  Eyes: Negative  Negative for photophobia and pain  Respiratory: Negative  Negative for shortness of breath  Cardiovascular: Negative  Negative for palpitations  Gastrointestinal: Negative  Negative for nausea and vomiting  Endocrine: Negative  Negative for cold intolerance and heat intolerance  Genitourinary: Negative  Negative for dysuria, frequency and urgency  Musculoskeletal: Negative  Negative for myalgias and neck pain  Skin: Negative  Negative for rash  Neurological: Negative  Negative for dizziness, tremors, seizures, syncope, facial asymmetry, speech difficulty, weakness, light-headedness, numbness and headaches  Hematological: Negative  Does not bruise/bleed easily  Psychiatric/Behavioral: Negative  Negative for confusion, hallucinations and sleep disturbance

## 2019-11-12 NOTE — TELEPHONE ENCOUNTER
Eloy Park,     Thank you so much for seeing Letty López yesterday! Please see the note below from Angie  I think that this is reasonable    Especially since he is working with ENT and using hearing aids  Dr Mulu Parks did suggest a short interval MRI (3 months) which should also be MRI Brain and IAC with and without contrast   This will help to charachterize  Rather than Letty López going to Neurosurgery right now, please will you order the MRI and the clinical team and reach out to him to be sure that he gets it set up?     Thanks!!

## 2020-02-03 ENCOUNTER — HOSPITAL ENCOUNTER (OUTPATIENT)
Dept: RADIOLOGY | Facility: HOSPITAL | Age: 50
Discharge: HOME | End: 2020-02-03
Payer: COMMERCIAL

## 2020-02-03 DIAGNOSIS — D49.2 NERVE SHEATH TUMOR: ICD-10-CM

## 2020-02-03 PROCEDURE — A9585 GADOBUTROL INJECTION: HCPCS | Performed by: NURSE PRACTITIONER

## 2020-02-03 PROCEDURE — 70553 MRI BRAIN STEM W/O & W/DYE: CPT

## 2020-02-03 RX ADMIN — GADOBUTROL 8 ML: 604.72 INJECTION INTRAVENOUS at 17:08

## 2020-02-12 ENCOUNTER — TELEPHONE (OUTPATIENT)
Dept: NEUROLOGY | Facility: CLINIC | Age: 50
End: 2020-02-12

## 2020-02-14 ENCOUNTER — OFFICE VISIT (OUTPATIENT)
Dept: NEUROLOGY | Facility: CLINIC | Age: 50
End: 2020-02-14
Payer: COMMERCIAL

## 2020-02-14 VITALS
HEART RATE: 72 BPM | WEIGHT: 184 LBS | SYSTOLIC BLOOD PRESSURE: 130 MMHG | BODY MASS INDEX: 27.89 KG/M2 | HEIGHT: 68 IN | DIASTOLIC BLOOD PRESSURE: 60 MMHG

## 2020-02-14 DIAGNOSIS — D49.2 NERVE SHEATH TUMOR: Primary | ICD-10-CM

## 2020-02-14 DIAGNOSIS — M50.30 DEGENERATIVE DISC DISEASE, CERVICAL: ICD-10-CM

## 2020-02-14 PROBLEM — D33.3 ACOUSTIC NEUROMA (HCC): Status: ACTIVE | Noted: 2020-02-14

## 2020-02-14 PROCEDURE — 99214 OFFICE O/P EST MOD 30 MIN: CPT | Performed by: PSYCHIATRY & NEUROLOGY

## 2020-02-14 NOTE — PROGRESS NOTES
Patient ID: Martina Castillo is a 54 y o  male  Assessment/Plan: This is a 53 y/o  Male who is here as a follow up for history of acoustic neuroma, cervical radiculopathy  Patient doing well overall  Diagnoses and all orders for this visit:    Nerve sheath tumor  MRI brain reviewed - 5-6mm stable acoustic neuroma  -patient asymptomatic at this time, will monitor  -will repeat imaging if patient has more symptoms in the future  Degenerative disc disease, cervical  -continue with gabapentin 100mg PO TID for now, he is tolerating it well       Follow up with Vivian next available     I would be happy to see the patient sooner if any new questions/concerns arise  Patient/Guardian was advised to the call the office if they have any questions and concerns in the meantime  Patient/Guardian does understand that if they have any new stroke like symptoms such as facial droop on one side, weakness/paralysis on either side, speech trouble, numbness on one side, balance issues, any vision changes, extreme dizziness or any new headache, to call 9-1-1 immediately or to proceed to the nearest ER immediately  Subjective:    HPI    This is a 53 y/o Male who is here as follow up for acoustic neuroma, neuropathic pain  He wanted another results of the MRI brain which I have reviewed the images of which does show a stable 5-6 mm could acoustic neuroma  He has been doing well in terms of cervical radiculopathy, and he states that gabapentin does seem to be helping with his tingling/numbness on his chest and his shoulder bilaterally  He is currently maintained on gabapentin 100mg PO TID  He denies any side effects from it at this time  He was having a lot of stressors at home, and he stopped taking lexapro and he has been feeling better overall  He does have a history anxiety but also stopped taking ativan      The following portions of the patient's history were reviewed and updated as appropriate: He  has a past medical history of GERD (gastroesophageal reflux disease)  He   Patient Active Problem List    Diagnosis Date Noted    Acoustic neuroma ([facility] [address] ) 02/14/2020    Nerve sheath tumor 11/11/2019    Loss of part of visual field 11/11/2019    Severe anxiety with panic 10/15/2019    Stress and adjustment reaction 10/15/2019    Psychophysiological insomnia 10/15/2019    GERD (gastroesophageal reflux disease) 10/15/2019    Degenerative disc disease, cervical 10/15/2019     He  has no past surgical history on file  His family history is not on file  He  reports that he has never smoked  He has never used smokeless tobacco  He reports that he does not drink alcohol or use drugs  Current Outpatient Medications   Medication Sig Dispense Refill    gabapentin (NEURONTIN) 100 mg capsule Take 1 capsule (100 mg total) by mouth 3 (three) times a day 90 capsule 0    omeprazole (PriLOSEC) 20 mg delayed release capsule Take 40 mg by mouth daily       LORazepam (ATIVAN) 0 5 mg tablet Take 1 tablet (0 5 mg total) by mouth every 8 (eight) hours as needed for anxiety for up to 10 days 15 tablet 0     No current facility-administered medications for this visit  Current Outpatient Medications on File Prior to Visit   Medication Sig    gabapentin (NEURONTIN) 100 mg capsule Take 1 capsule (100 mg total) by mouth 3 (three) times a day    omeprazole (PriLOSEC) 20 mg delayed release capsule Take 40 mg by mouth daily     LORazepam (ATIVAN) 0 5 mg tablet Take 1 tablet (0 5 mg total) by mouth every 8 (eight) hours as needed for anxiety for up to 10 days    [DISCONTINUED] escitalopram (LEXAPRO) 20 mg tablet Take 20 mg by mouth daily     No current facility-administered medications on file prior to visit  He is allergic to tetanus-diphth-acell pertussis [tetanus-diphth-acell pertussis]         Objective:    Blood pressure 130/60, pulse 72, height 5' 8" (1 727 m), weight 83 5 kg (184 lb)      Physical Exam  General - alert awake, follows commands  Skin - no rashes  Chest - non-labored breathing noted  Speech - no aphasia noted  Motor 5/5 throughout   Gait - normal    ROS:  I personally reviewed ROS   Review of Systems   Constitutional: Negative  Negative for appetite change and fever  HENT: Negative  Negative for hearing loss, tinnitus, trouble swallowing and voice change  Eyes: Negative  Negative for photophobia and pain  Respiratory: Negative  Negative for shortness of breath  Cardiovascular: Negative  Negative for palpitations  Gastrointestinal: Negative  Negative for nausea and vomiting  Endocrine: Negative  Negative for cold intolerance and heat intolerance  Genitourinary: Negative  Negative for dysuria, frequency and urgency  Musculoskeletal: Positive for gait problem  Negative for myalgias and neck pain  Patient states that he has balance problems off and on at times  Skin: Negative  Negative for rash  Neurological: Negative for dizziness, tremors, seizures, syncope, facial asymmetry, speech difficulty, weakness, light-headedness, numbness and headaches  Hematological: Negative  Does not bruise/bleed easily  Psychiatric/Behavioral: Negative  Negative for confusion, hallucinations and sleep disturbance

## 2020-06-15 ENCOUNTER — APPOINTMENT (OUTPATIENT)
Dept: LAB | Facility: HOSPITAL | Age: 50
End: 2020-06-15
Payer: COMMERCIAL

## 2020-06-15 ENCOUNTER — TRANSCRIBE ORDERS (OUTPATIENT)
Dept: ADMINISTRATIVE | Facility: HOSPITAL | Age: 50
End: 2020-06-15

## 2020-06-15 DIAGNOSIS — E03.9 ACQUIRED HYPOTHYROIDISM: ICD-10-CM

## 2020-06-15 DIAGNOSIS — N40.0 BENIGN PROSTATIC HYPERPLASIA, UNSPECIFIED WHETHER LOWER URINARY TRACT SYMPTOMS PRESENT: ICD-10-CM

## 2020-06-15 DIAGNOSIS — I10 ESSENTIAL HYPERTENSION, MALIGNANT: ICD-10-CM

## 2020-06-15 DIAGNOSIS — J31.0 CHRONIC RHINITIS: ICD-10-CM

## 2020-06-15 DIAGNOSIS — E55.9 VITAMIN D DEFICIENCY DISEASE: ICD-10-CM

## 2020-06-15 DIAGNOSIS — E78.5 HYPERLIPIDEMIA, UNSPECIFIED HYPERLIPIDEMIA TYPE: ICD-10-CM

## 2020-06-15 DIAGNOSIS — N39.0 URINARY TRACT INFECTION WITHOUT HEMATURIA, SITE UNSPECIFIED: ICD-10-CM

## 2020-06-15 DIAGNOSIS — J30.89 NON-SEASONAL ALLERGIC RHINITIS, UNSPECIFIED TRIGGER: ICD-10-CM

## 2020-06-15 DIAGNOSIS — R94.31 NONSPECIFIC ABNORMAL ELECTROCARDIOGRAM (ECG) (EKG): ICD-10-CM

## 2020-06-15 DIAGNOSIS — L27.2 DERMATITIS DUE TO FOOD TAKEN INTERNALLY: ICD-10-CM

## 2020-06-15 DIAGNOSIS — I50.1 LEFT HEART FAILURE (HCC): Primary | ICD-10-CM

## 2020-06-15 LAB
25(OH)D3 SERPL-MCNC: 28.3 NG/ML (ref 30–100)
ALBUMIN SERPL BCP-MCNC: 4.5 G/DL (ref 3.5–5)
ALP SERPL-CCNC: 76 U/L (ref 46–116)
ALT SERPL W P-5'-P-CCNC: 38 U/L (ref 12–78)
ANION GAP SERPL CALCULATED.3IONS-SCNC: 7 MMOL/L (ref 4–13)
AST SERPL W P-5'-P-CCNC: 14 U/L (ref 5–45)
BASOPHILS # BLD AUTO: 0.03 THOUSANDS/ΜL (ref 0–0.1)
BASOPHILS NFR BLD AUTO: 1 % (ref 0–1)
BILIRUB SERPL-MCNC: 0.6 MG/DL (ref 0.2–1)
BILIRUB UR QL STRIP: NEGATIVE
BUN SERPL-MCNC: 12 MG/DL (ref 5–25)
CALCIUM SERPL-MCNC: 8.9 MG/DL (ref 8.3–10.1)
CHLORIDE SERPL-SCNC: 104 MMOL/L (ref 100–108)
CHOLEST SERPL-MCNC: 203 MG/DL (ref 50–200)
CLARITY UR: CLEAR
CO2 SERPL-SCNC: 27 MMOL/L (ref 21–32)
COLOR UR: YELLOW
CREAT SERPL-MCNC: 0.95 MG/DL (ref 0.6–1.3)
EOSINOPHIL # BLD AUTO: 0.05 THOUSAND/ΜL (ref 0–0.61)
EOSINOPHIL NFR BLD AUTO: 1 % (ref 0–6)
ERYTHROCYTE [DISTWIDTH] IN BLOOD BY AUTOMATED COUNT: 12 % (ref 11.6–15.1)
ERYTHROCYTE [SEDIMENTATION RATE] IN BLOOD: 1 MM/HOUR (ref 0–10)
GFR SERPL CREATININE-BSD FRML MDRD: 95 ML/MIN/1.73SQ M
GLUCOSE SERPL-MCNC: 120 MG/DL (ref 65–140)
GLUCOSE UR STRIP-MCNC: NEGATIVE MG/DL
HCT VFR BLD AUTO: 47.8 % (ref 36.5–49.3)
HDLC SERPL-MCNC: 45 MG/DL
HGB BLD-MCNC: 16.4 G/DL (ref 12–17)
HGB UR QL STRIP.AUTO: NEGATIVE
IMM GRANULOCYTES # BLD AUTO: 0.01 THOUSAND/UL (ref 0–0.2)
IMM GRANULOCYTES NFR BLD AUTO: 0 % (ref 0–2)
KETONES UR STRIP-MCNC: NEGATIVE MG/DL
LDLC SERPL CALC-MCNC: 148 MG/DL (ref 0–100)
LEUKOCYTE ESTERASE UR QL STRIP: NEGATIVE
LYMPHOCYTES # BLD AUTO: 1.3 THOUSANDS/ΜL (ref 0.6–4.47)
LYMPHOCYTES NFR BLD AUTO: 23 % (ref 14–44)
MCH RBC QN AUTO: 29.3 PG (ref 26.8–34.3)
MCHC RBC AUTO-ENTMCNC: 34.3 G/DL (ref 31.4–37.4)
MCV RBC AUTO: 86 FL (ref 82–98)
MONOCYTES # BLD AUTO: 0.41 THOUSAND/ΜL (ref 0.17–1.22)
MONOCYTES NFR BLD AUTO: 7 % (ref 4–12)
NEUTROPHILS # BLD AUTO: 3.97 THOUSANDS/ΜL (ref 1.85–7.62)
NEUTS SEG NFR BLD AUTO: 68 % (ref 43–75)
NITRITE UR QL STRIP: NEGATIVE
NONHDLC SERPL-MCNC: 158 MG/DL
NRBC BLD AUTO-RTO: 0 /100 WBCS
PH UR STRIP.AUTO: 6 [PH]
PLATELET # BLD AUTO: 185 THOUSANDS/UL (ref 149–390)
PMV BLD AUTO: 9.8 FL (ref 8.9–12.7)
POTASSIUM SERPL-SCNC: 3.7 MMOL/L (ref 3.5–5.3)
PROT SERPL-MCNC: 7.8 G/DL (ref 6.4–8.2)
PROT UR STRIP-MCNC: NEGATIVE MG/DL
PSA SERPL-MCNC: 0.9 NG/ML (ref 0–4)
RBC # BLD AUTO: 5.59 MILLION/UL (ref 3.88–5.62)
SODIUM SERPL-SCNC: 138 MMOL/L (ref 136–145)
SP GR UR STRIP.AUTO: 1.02 (ref 1–1.03)
T4 FREE SERPL-MCNC: 0.99 NG/DL (ref 0.76–1.46)
TRIGL SERPL-MCNC: 48 MG/DL
TSH SERPL DL<=0.05 MIU/L-ACNC: 1.54 UIU/ML (ref 0.36–3.74)
UROBILINOGEN UR QL STRIP.AUTO: 0.2 E.U./DL
VIT B12 SERPL-MCNC: 600 PG/ML (ref 100–900)
WBC # BLD AUTO: 5.77 THOUSAND/UL (ref 4.31–10.16)

## 2020-06-15 PROCEDURE — 80061 LIPID PANEL: CPT

## 2020-06-15 PROCEDURE — 85025 COMPLETE CBC W/AUTO DIFF WBC: CPT

## 2020-06-15 PROCEDURE — 81003 URINALYSIS AUTO W/O SCOPE: CPT | Performed by: FAMILY MEDICINE

## 2020-06-15 PROCEDURE — 86200 CCP ANTIBODY: CPT

## 2020-06-15 PROCEDURE — 36415 COLL VENOUS BLD VENIPUNCTURE: CPT | Performed by: FAMILY MEDICINE

## 2020-06-15 PROCEDURE — 85652 RBC SED RATE AUTOMATED: CPT | Performed by: FAMILY MEDICINE

## 2020-06-15 PROCEDURE — 84443 ASSAY THYROID STIM HORMONE: CPT

## 2020-06-15 PROCEDURE — 86618 LYME DISEASE ANTIBODY: CPT

## 2020-06-15 PROCEDURE — 86038 ANTINUCLEAR ANTIBODIES: CPT

## 2020-06-15 PROCEDURE — G0103 PSA SCREENING: HCPCS

## 2020-06-15 PROCEDURE — 86430 RHEUMATOID FACTOR TEST QUAL: CPT

## 2020-06-15 PROCEDURE — 82306 VITAMIN D 25 HYDROXY: CPT

## 2020-06-15 PROCEDURE — 80053 COMPREHEN METABOLIC PANEL: CPT

## 2020-06-15 PROCEDURE — 84439 ASSAY OF FREE THYROXINE: CPT

## 2020-06-15 PROCEDURE — 82607 VITAMIN B-12: CPT

## 2020-06-16 LAB
B BURGDOR IGG+IGM SER-ACNC: <0.91 ISR (ref 0–0.9)
RHEUMATOID FACT SER QL LA: NEGATIVE

## 2020-06-17 LAB
CCP IGA+IGG SERPL IA-ACNC: 4 UNITS (ref 0–19)
RYE IGE QN: NEGATIVE

## 2020-07-01 ENCOUNTER — HOSPITAL ENCOUNTER (OUTPATIENT)
Dept: NON INVASIVE DIAGNOSTICS | Facility: HOSPITAL | Age: 50
Discharge: HOME | End: 2020-07-01
Payer: COMMERCIAL

## 2020-07-01 DIAGNOSIS — I50.1 LEFT HEART FAILURE (HCC): ICD-10-CM

## 2020-07-01 DIAGNOSIS — R94.31 NONSPECIFIC ABNORMAL ELECTROCARDIOGRAM (ECG) (EKG): ICD-10-CM

## 2020-07-01 PROCEDURE — 93306 TTE W/DOPPLER COMPLETE: CPT

## 2020-07-01 PROCEDURE — 93306 TTE W/DOPPLER COMPLETE: CPT | Performed by: INTERNAL MEDICINE

## 2020-08-21 NOTE — PROGRESS NOTES
Patient ID: Arnaldo Jacinto is a 54 y o  male  Assessment/Plan:     acoustic neuroma  -- patient's last MRI revealed 5-6 mm stable acoustic neuroma  Patient's balance has improved  Primary complaint is in regards to tinnitus and hearing loss, being followed by ENT,  Considering hearing device  -- repeat MRI brain 2022       cervical degenerative disc disease  -- continue gabapentin 100 mg t i  d  Patient can increase his dose if needed  -- can try over-the-counter Lidoderm patch for flare ups     migraine   headaches well control has had only 2 migraines since his last visit  -- patient has access to Imitrex however slow onset, if needed, can try Maxalt MLT  -- encouraged adequate sleep hygiene as well as appropriate fluid intake  -- patient to call given any increase in his headaches    No problem-specific Assessment & Plan notes found for this encounter  Diagnoses and all orders for this visit:    Acoustic neuroma ([address] )    Degenerative disc disease, cervical    Generalized headaches           Subjective:    FLORY    James Kong is a 19-year-old male who presents today for neurologic follow-up for acoustic neuroma, neuropathic pain  Patient last seen in February  At that time, had undergone updated MRI with evidence of stable 5-6 mm acoustic neuroma  If patient continues to describe issues with the tinnitus, hearing loss  In  the interim, patient followed up with ENT for  acoustic neuroma,complaints of ongoing tinnitus and hearing issues  He underwent audiometric testing which showed pure tone levels in the right ear to be  decreased, there is no functional discrimination score in the right ear, left ear is normal   Patient is firmly against any type of surgical intervention, imbalance is stable, no significant changes in his hearing, no indication for stereotactic radiation which is found to be of little benefit in regards to providing him symptomatic relief     Patient to consider use of a bone anchored hearing aid  Patient for repeat MRI of the brain in February 2022  Fortunately at this point, he states his balance is fairly stable, no reported interim falls  Patient does have a cervical disc disease, he continues on low-dose gabapentin which is helpful for his paresthesias  Denies any side effects  He did have a mild flare in his cervical region a few weeks ago, he was given a Medrol dose pack by his PCP which was ineffective  Fortunately seems to have resolved  his headaches are well controlled he has had only 2 interim migraines  He has access to Imitrex however states that it took over an hour to take effect  He finds at this point that the use of Advil p m  to be of more benefit    The following portions of the patient's history were reviewed and updated as appropriate: allergies, current medications, past family history, past medical history, past social history, past surgical history and problem list          Objective:  Current Outpatient Medications   Medication Sig Dispense Refill    gabapentin (NEURONTIN) 100 mg capsule Take 1 capsule (100 mg total) by mouth 3 (three) times a day 90 capsule 0    omeprazole (PriLOSEC) 20 mg delayed release capsule Take 40 mg by mouth daily       escitalopram (LEXAPRO) 5 mg tablet Take 5 mg by mouth daily at bedtime      LORazepam (ATIVAN) 0 5 mg tablet Take 1 tablet (0 5 mg total) by mouth every 8 (eight) hours as needed for anxiety for up to 10 days (Patient not taking: Reported on 8/24/2020) 15 tablet 0     No current facility-administered medications for this visit  Blood pressure 132/72, pulse 80, temperature 97 5 °F (36 4 °C), height 5' 8" (1 727 m), weight 81 6 kg (179 lb 12 8 oz)  Physical Exam  HENT:      Head: Normocephalic  Right Ear: No hearing normal       Left Ear: Hearing normal    Eyes:      Extraocular Movements: Extraocular movements intact  Pupils: Pupils are equal, round, and reactive to light     Cardiovascular: Rate and Rhythm: Normal rate and regular rhythm  Pulmonary:      Effort: Pulmonary effort is normal    Musculoskeletal: Normal range of motion  Skin:     General: Skin is warm  Neurological:      Coordination: Coordination is intact  Deep Tendon Reflexes: Strength normal and reflexes are normal and symmetric  Psychiatric:         Mood and Affect: Mood normal          Speech: Speech normal          Behavior: Behavior normal          Thought Content: Thought content normal          Judgment: Judgment normal          Neurological Exam  Mental Status  Awake, alert and oriented to person, place and time  Speech is normal  Language is fluent with no aphasia  Cranial Nerves  CN III, IV, VI: Extraocular movements intact bilaterally  Pupils equal round and reactive to light bilaterally  CN V: Facial sensation is normal   CN VII: Full and symmetric facial movement  CN VIII:  Right: Hearing is decreased  Left: Hearing is normal   CN XI: Shoulder shrug strength is normal   CN XII: Tongue midline without atrophy or fasciculations  Motor  Normal muscle bulk throughout  Normal muscle tone  No abnormal involuntary movements  Strength is 5/5 throughout all four extremities  Sensory  Sensation is intact to light touch, pinprick, vibration and proprioception in all four extremities  Reflexes  Deep tendon reflexes are 2+ and symmetric in all four extremities with downgoing toes bilaterally  Coordination  Finger-to-nose, rapid alternating movements and heel-to-shin normal bilaterally without dysmetria  ROS: personally reviewed with patient today's visit    Review of Systems   Constitutional: Negative  Negative for appetite change and fever  HENT: Negative  Negative for hearing loss, tinnitus, trouble swallowing and voice change  Eyes: Negative  Negative for photophobia and pain  Respiratory: Negative  Negative for shortness of breath  Cardiovascular: Negative    Negative for palpitations  Gastrointestinal: Negative  Negative for nausea and vomiting  Endocrine: Negative  Negative for cold intolerance  Genitourinary: Negative  Negative for dysuria, frequency and urgency  Musculoskeletal: Negative  Negative for myalgias and neck pain  Skin: Negative  Negative for rash  Neurological: Negative  Negative for dizziness, tremors, seizures, syncope, facial asymmetry, speech difficulty, weakness, light-headedness, numbness and headaches  Hematological: Negative  Does not bruise/bleed easily  Psychiatric/Behavioral: Negative  Negative for confusion, hallucinations and sleep disturbance

## 2020-08-24 ENCOUNTER — OFFICE VISIT (OUTPATIENT)
Dept: NEUROLOGY | Facility: CLINIC | Age: 50
End: 2020-08-24
Payer: COMMERCIAL

## 2020-08-24 VITALS
HEART RATE: 80 BPM | BODY MASS INDEX: 27.25 KG/M2 | SYSTOLIC BLOOD PRESSURE: 132 MMHG | DIASTOLIC BLOOD PRESSURE: 72 MMHG | TEMPERATURE: 97.5 F | WEIGHT: 179.8 LBS | HEIGHT: 68 IN

## 2020-08-24 DIAGNOSIS — M50.30 DEGENERATIVE DISC DISEASE, CERVICAL: ICD-10-CM

## 2020-08-24 DIAGNOSIS — R51.9 GENERALIZED HEADACHES: ICD-10-CM

## 2020-08-24 DIAGNOSIS — D33.3 ACOUSTIC NEUROMA (HCC): Primary | ICD-10-CM

## 2020-08-24 PROCEDURE — 99214 OFFICE O/P EST MOD 30 MIN: CPT | Performed by: NURSE PRACTITIONER

## 2020-08-24 RX ORDER — ESCITALOPRAM OXALATE 5 MG/1
5 TABLET ORAL
COMMUNITY
Start: 2020-06-18

## 2020-08-24 NOTE — PATIENT INSTRUCTIONS
Doing well  Continue Gabapentin, can increase if needed  Follow-up with ENT   for MRI 2022   patient to call given any increase in difficulty with ambulation or imbalance   can try over-the-counter Lidoderm cream for neck

## 2021-05-26 ENCOUNTER — OFFICE VISIT (OUTPATIENT)
Dept: NEUROLOGY | Facility: CLINIC | Age: 51
End: 2021-05-26
Payer: COMMERCIAL

## 2021-05-26 VITALS
WEIGHT: 184 LBS | BODY MASS INDEX: 27.89 KG/M2 | DIASTOLIC BLOOD PRESSURE: 67 MMHG | HEART RATE: 82 BPM | HEIGHT: 68 IN | SYSTOLIC BLOOD PRESSURE: 132 MMHG

## 2021-05-26 DIAGNOSIS — D33.3 ACOUSTIC NEUROMA (HCC): Primary | ICD-10-CM

## 2021-05-26 DIAGNOSIS — R51.9 GENERALIZED HEADACHES: ICD-10-CM

## 2021-05-26 PROBLEM — M54.2 CERVICALGIA: Status: ACTIVE | Noted: 2021-05-26

## 2021-05-26 PROCEDURE — 99214 OFFICE O/P EST MOD 30 MIN: CPT | Performed by: NURSE PRACTITIONER

## 2021-05-26 NOTE — PROGRESS NOTES
Review of Systems   Constitutional: Negative  Negative for appetite change and fever  HENT: Negative  Negative for hearing loss, tinnitus, trouble swallowing and voice change  Eyes: Negative  Negative for photophobia and pain  Respiratory: Negative  Negative for shortness of breath  Cardiovascular: Negative  Negative for palpitations  Gastrointestinal: Negative  Negative for nausea and vomiting  Endocrine: Negative  Negative for cold intolerance  Genitourinary: Negative  Negative for dysuria, frequency and urgency  Musculoskeletal: Positive for neck pain  Negative for myalgias  Skin: Negative  Negative for rash  Neurological: Positive for headaches  Negative for dizziness, tremors, seizures, syncope, facial asymmetry, speech difficulty, weakness, light-headedness and numbness  Hematological: Negative  Does not bruise/bleed easily  Psychiatric/Behavioral: Negative  Negative for confusion, hallucinations and sleep disturbance  All other systems reviewed and are negative

## 2021-05-26 NOTE — PATIENT INSTRUCTIONS
Patient to continue gabapentin   can try Voltaren gel for neck and shoulders   has access to sumatriptan   patient to call given any increase in headaches   following with ENT for follow-up scan 2022

## 2022-04-25 ENCOUNTER — TELEPHONE (OUTPATIENT)
Dept: NEUROLOGY | Facility: CLINIC | Age: 52
End: 2022-04-25

## 2022-04-25 DIAGNOSIS — D33.3 ACOUSTIC NEUROMA (HCC): Primary | ICD-10-CM

## 2022-04-25 NOTE — TELEPHONE ENCOUNTER
Can let patient know Beba Hollis is not in the office  I reviewed his chart and placed an order for MRI brain with attn [facility], along with BUN/Cr  His May visit with Beba Hollis will need to be r/s to a headache provider (he has also seen Elba Castleman in the past, Jordyn Young may be a good option as well regarding availability, although looks like patient lives in [facility], so [facility] is close)  He can get the MRI done before that visit

## 2022-04-25 NOTE — TELEPHONE ENCOUNTER
----- Message from Rodrick Menjivar RN sent at 4/25/2022 12:04 PM EDT -----  Regarding: FW: Migraines becoming more consistent     ----- Message -----  From: Jose A Song  Sent: 4/21/2022  10:51 AM EDT  To: Neurology [facility] Clinical Team 3  Subject: FW: Migraines becoming more consistent             ----- Message -----  From: Teri Henry  Sent: 4/21/2022  10:48 AM EDT  To: Neurology [facility]  Subject: Migraines becoming more consistent               Good morning  This is Teri Henry  I had my last yearly check up with you in May 2021  I have my next appointment scheduled for Monday, may 23rd at 10:00  I have been experiencing more frequent migraines in the past couple months and had one two days ago that lasted longer then usual      Have not had an MRI of my acoustic neuroma since 2/2020  I know someone wanted to do a follow up in 2022 to make sure things have not progressed  Is this something I should have done prior to my may visit, or wait to discuss with you then       Thank you  Teri Henry

## 2022-04-26 NOTE — TELEPHONE ENCOUNTER
Vilma Fink called to reschedule his appointment with [facility] for after his MRI       Advised that [facility] is out of office and reschedule him with Homar Molina in \[facility] [place] [facility]\"" on 6/1/22 at 815 am  not examined

## 2022-05-06 NOTE — TELEPHONE ENCOUNTER
Called Davion to reschedule him with Nubia Zaldivar, offered him 7/6/22 at 145 pm in [facility] and he accepted

## 2022-05-24 ENCOUNTER — HOSPITAL ENCOUNTER (OUTPATIENT)
Dept: MRI IMAGING | Facility: HOSPITAL | Age: 52
Discharge: HOME | End: 2022-05-24
Payer: COMMERCIAL

## 2022-05-24 DIAGNOSIS — D33.3 ACOUSTIC NEUROMA (HCC): ICD-10-CM

## 2022-05-24 PROCEDURE — G1004 CDSM NDSC: HCPCS

## 2022-05-24 PROCEDURE — A9585 GADOBUTROL INJECTION: HCPCS | Performed by: PHYSICIAN ASSISTANT

## 2022-05-24 PROCEDURE — 70553 MRI BRAIN STEM W/O & W/DYE: CPT

## 2022-05-24 RX ADMIN — GADOBUTROL 8 ML: 604.72 INJECTION INTRAVENOUS at 18:43

## 2022-05-26 ENCOUNTER — TELEPHONE (OUTPATIENT)
Dept: NEUROLOGY | Facility: CLINIC | Age: 52
End: 2022-05-26

## 2022-05-26 NOTE — TELEPHONE ENCOUNTER
----- Message from Gifty Birmingham PA-C sent at 5/25/2022  4:14 PM EDT -----  (Vivian's patient, I have never seen him--see message dated 4/25/22)  Can let patient know MRI brain is stable    No change in vestibular schwannoma

## 2022-06-28 ENCOUNTER — TELEPHONE (OUTPATIENT)
Dept: NEUROLOGY | Facility: CLINIC | Age: 52
End: 2022-06-28

## 2022-06-28 NOTE — TELEPHONE ENCOUNTER
Called and spoke to patient - confirmed upcoming appointment with Keysha Alejandre on 07/07/22 1:45 Pm at the [facility] office  Provided patient with apt date, time and location  Informed patient that check in is at least 15 minutes prior to apt time  The patient is not  having any issues or concerns at this time

## 2022-12-28 ENCOUNTER — TELEPHONE (OUTPATIENT)
Dept: NEUROLOGY | Facility: CLINIC | Age: 52
End: 2022-12-28

## 2022-12-28 NOTE — TELEPHONE ENCOUNTER
Called and spoke to patient - confirmed upcoming appointment with Zoe Shore on 01/11/22 8:00 am at the [facility] office  Provided patient with apt date, time and location  Informed patient that check in is at least 15 minutes prior to apt time  The patient is not  having any issues or concerns at this time

## 2023-03-28 ENCOUNTER — TELEPHONE (OUTPATIENT)
Dept: NEUROLOGY | Facility: CLINIC | Age: 53
End: 2023-03-28

## 2023-03-28 NOTE — TELEPHONE ENCOUNTER
Called and spoke to patient - confirmed upcoming appointment with Thai Arce on 04/12/23 8:00 am at the [facility] office  Provided patient with apt date, time and location  Informed patient that check in is at least 15 minutes prior to apt time  The patient is not  having any issues or concerns at this time

## 2023-05-12 ENCOUNTER — HOSPITAL ENCOUNTER (OUTPATIENT)
Dept: NON INVASIVE DIAGNOSTICS | Facility: HOSPITAL | Age: 53
Discharge: HOME | End: 2023-05-12

## 2023-05-12 DIAGNOSIS — E78.5 HYPERLIPIDEMIA: ICD-10-CM

## 2023-05-12 DIAGNOSIS — I65.29 CAROTID ARTERY STENOSIS: ICD-10-CM

## 2023-05-12 DIAGNOSIS — Z86.69 HISTORY OF VISUAL DISTURBANCE: ICD-10-CM

## 2024-01-16 ENCOUNTER — APPOINTMENT (OUTPATIENT)
Dept: URGENT CARE | Facility: CLINIC | Age: 54
End: 2024-01-16

## 2024-04-02 ENCOUNTER — TELEPHONE (OUTPATIENT)
Dept: NEUROLOGY | Facility: CLINIC | Age: 54
End: 2024-04-02

## 2024-04-02 NOTE — TELEPHONE ENCOUNTER
Patient is scheduled for an office visit with Nadeen 4/12.    Called patient to confirm appt, LMOM

## 2024-04-12 ENCOUNTER — OFFICE VISIT (OUTPATIENT)
Dept: NEUROLOGY | Facility: CLINIC | Age: 54
End: 2024-04-12
Payer: COMMERCIAL

## 2024-04-12 VITALS
HEART RATE: 82 BPM | SYSTOLIC BLOOD PRESSURE: 112 MMHG | TEMPERATURE: 98.2 F | BODY MASS INDEX: 29.24 KG/M2 | DIASTOLIC BLOOD PRESSURE: 70 MMHG | WEIGHT: 198 LBS | OXYGEN SATURATION: 95 %

## 2024-04-12 DIAGNOSIS — D33.3 ACOUSTIC NEUROMA (HCC): ICD-10-CM

## 2024-04-12 DIAGNOSIS — E78.5 HYPERLIPIDEMIA: Primary | ICD-10-CM

## 2024-04-12 DIAGNOSIS — G43.009 MIGRAINE WITHOUT AURA AND WITHOUT STATUS MIGRAINOSUS, NOT INTRACTABLE: ICD-10-CM

## 2024-04-12 PROCEDURE — 99213 OFFICE O/P EST LOW 20 MIN: CPT | Performed by: NURSE PRACTITIONER

## 2024-04-12 NOTE — PATIENT INSTRUCTIONS
Continue as needed advil for headaches; let me know if frequency change or other changes in headaches  Continue gabapentin as prescribed by family doctor  I am ok with waiting 1 more year for repeat MRI follow up but if there is any change in symptoms reach out to the office  Repeat labs  Follow up in 1 year

## 2024-04-12 NOTE — PROGRESS NOTES
Patient ID: Davion Kelly is a 53 y.o. male.    Assessment/Plan:  Patient Instructions:  Continue as needed advil for headaches; let me know if frequency change or other changes in headaches  Continue gabapentin as prescribed by family doctor  I am ok with waiting 1 more year for repeat MRI follow up but if there is any change in symptoms reach out to the office  Repeat labs  Follow up in 1 year     Diagnoses and all orders for this visit:    Hyperlipidemia  -     Lipid Panel with Direct LDL reflex; Future    Migraine without aura and without status migrainosus, not intractable  -     CBC and differential; Future  -     Comprehensive metabolic panel; Future    Acoustic neuroma (HCC)   -due for repeat MRI (previous repeat showing no change in size); states still paying for last MRI and would prefer to hold off for one more year; no new symptoms to report/no change in exam. Discussed reaching out to the office with any new symptoms and will repeat the MRI next year for monitoring.    Subjective:    HPI  Davion Kelly is a 53 year old male with past medical history of migraine, depression/anxiety (stable on low dose lexapro), right vestibular schwannoma (with associated hearing loss/tinnitus), c spine degenerative changes and neuropathic pain that has improved with low dose gabapentin (100mg TID) who presents for 1 year follow up.     Since last office visit:  Headache frequency has not changed 1-2x/year with severe headaches that he has noticed respond better to advil migraine than sumatriptan. He states if he takes the medication and sits down the headache will start to go away in 1 hour; he is not looking for other acute/preventative options at this time. Gabapentin low dose 100mg TID is helpful for chronic neck pain and he uses stretches/TENS unit PRN. He denies any change to tinnitus (got used to it per his report) or hearing/balance. He notes potential triggers for headache include dehydration or lack of caffeine  which he is able to correct most times. He did not get his repeat labs; he did get his repeat Carotid US which was reviewed with him today. He continues to stay active/work.    VAS 5/12/2023  Impression  RIGHT:  There is <50% stenosis noted in the internal carotid artery. Plaque is  heterogenous and smooth.  Vertebral artery flow is antegrade.  There is no significant subclavian artery disease.  LEFT:  There is no evidence of arterial disease throughout the extracranial carotid  system.  Vertebral artery flow is antegrade.  There is no significant subclavian artery disease.  Compared to previous study on 10/18/2019, there is no significant change    Previous history:  In 2019 he had an episode of peripheral right sided vision loss that was transient and not painful. He states he had this one time in the past before. He had a CUS done at the time which showed <50% stenosis right carotid artery and no left carotid stenosis. He does have hyperlipidemia  in 2020; will recheck carotid study and lipid panel for further evaluation.   Family history of CAD     headaches have occurred infrequently at 1-2x/year; these started in his 30's; he has severe right periorbital pain associated with restlessness (states laying down makes it worse), photophobia, eye drainage. Sometimes this can progress to a whole head pain.     MRI brain/IAC 5/24/2022:  FINDINGS:  BRAIN PARENCHYMA:  There is no discrete mass, mass effect or midline shift.  Brainstem and cerebellum demonstrate normal signal. There is no intracranial hemorrhage.  There is no evidence of acute infarction and diffusion imaging is unremarkable.    Punctate focus of nonspecific FLAIR signal hyperintensity in the right frontal lobe, stable. There is a left frontal developmental venous anomaly, a branch of normal venous drainage.  IAC'S:  There is a stable 4 x 5 mm enhancing nodule within the right internal auditory canal consistent with a vestibular schwannoma.  No  new nodular areas of enhancement identified and no lesions of the contralateral CP angle.  VENTRICLES:  Normal.  SELLA AND PITUITARY GLAND:  Normal.  ORBITS:  Normal.  PARANASAL SINUSES:  Normal.  VASCULATURE:  Evaluation of the major intracranial vasculature demonstrates appropriate flow voids.  CALVARIUM AND SKULL BASE:  Normal.  EXTRACRANIAL SOFT TISSUES:  Normal.    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  There is moderate left foraminal narrowing at C3-4.  No cord compression or cord signal abnormality.      The following portions of the patient's history were reviewed and updated as appropriate: allergies, current medications, past family history, past medical history, past social history, past surgical history, and problem list.         Objective:    Blood pressure 112/70, pulse 82, temperature 98.2 °F (36.8 °C), temperature source Temporal, weight 89.8 kg (198 lb), SpO2 95%.    Physical Exam  Vitals reviewed.   Constitutional:       General: He is not in acute distress.  HENT:      Head: Normocephalic and atraumatic.      Right Ear: External ear normal.      Left Ear: External ear normal.      Nose: Nose normal.      Mouth/Throat:      Pharynx: Oropharynx is clear.   Eyes:      General: Lids are normal.         Right eye: No discharge.         Left eye: No discharge.      Extraocular Movements: Extraocular movements intact.      Pupils: Pupils are equal, round, and reactive to light.   Cardiovascular:      Rate and Rhythm: Normal rate and regular rhythm.      Pulses: Normal pulses.      Heart sounds: Normal heart sounds.   Pulmonary:      Effort: Pulmonary effort is normal. No respiratory distress.      Breath sounds: Normal breath sounds.   Abdominal:      General: There is no distension.   Musculoskeletal:      Cervical back: Normal range of motion. Tenderness present.      Right lower leg: No edema.      Left lower leg: No edema.    Skin:     General: Skin is warm.      Capillary Refill: Capillary refill takes less than 2 seconds.      Findings: No rash.   Neurological:      General: No focal deficit present.      Mental Status: He is alert. Mental status is at baseline.      Motor: Motor strength is normal.     Coordination: Romberg sign negative.      Deep Tendon Reflexes:      Reflex Scores:       Brachioradialis reflexes are 2+ on the right side and 2+ on the left side.       Patellar reflexes are 2+ on the right side and 2+ on the left side.  Psychiatric:         Mood and Affect: Mood normal.         Speech: Speech normal.         Behavior: Behavior normal.         Neurological Exam  Mental Status  Alert. Oriented to person, place and time. Speech is normal. Language is fluent with no aphasia.    Cranial Nerves  CN II: Visual acuity is normal. Visual fields full to confrontation.  CN III, IV, VI: Extraocular movements intact bilaterally. Normal lids and orbits bilaterally. Pupils equal round and reactive to light bilaterally.  CN V: Facial sensation is normal.  CN VII: Full and symmetric facial movement.  CN VIII: Hearing is normal.  CN IX, X: Palate elevates symmetrically. Normal gag reflex.  CN XI: Shoulder shrug strength is normal.  CN XII: Tongue midline without atrophy or fasciculations.    Motor   Strength is 5/5 throughout all four extremities.    Sensory  Light touch is normal in upper and lower extremities.     Reflexes                                            Right                      Left  Brachioradialis                    2+                         2+  Patellar                                2+                         2+    Coordination  Right: Rapid alternating movement normal.Left: Rapid alternating movement normal.    Gait  Casual gait is normal including stance, stride, and arm swing. Romberg is absent. Able to rise from chair without using arms.        ROS:    Review of Systems   Constitutional:  Negative for  appetite change, fatigue and fever.   HENT: Negative.  Negative for hearing loss, tinnitus, trouble swallowing and voice change.    Eyes:  Positive for photophobia (only with migraines). Negative for pain.   Respiratory: Negative.  Negative for shortness of breath.    Cardiovascular: Negative.  Negative for palpitations.   Gastrointestinal:  Positive for nausea (w/migraines). Negative for vomiting.   Endocrine: Negative.  Negative for cold intolerance.   Genitourinary: Negative.  Negative for dysuria, frequency and urgency.   Musculoskeletal:  Negative for back pain, gait problem, myalgias, neck pain and neck stiffness.   Skin: Negative.  Negative for rash.   Allergic/Immunologic: Negative.    Neurological:  Positive for headaches (1-2 migraines annually, states otc meds work better than sumatriptan). Negative for dizziness, tremors, seizures, syncope, facial asymmetry, speech difficulty, weakness, light-headedness and numbness.   Hematological: Negative.  Does not bruise/bleed easily.   Psychiatric/Behavioral: Negative.  Negative for confusion, hallucinations and sleep disturbance.    ROS was reviewed and updated as appropriate

## 2024-11-23 ENCOUNTER — APPOINTMENT (OUTPATIENT)
Dept: LAB | Facility: HOSPITAL | Age: 54
End: 2024-11-23
Payer: COMMERCIAL

## 2024-11-23 DIAGNOSIS — E78.5 HYPERLIPIDEMIA: ICD-10-CM

## 2024-11-23 DIAGNOSIS — G43.009 MIGRAINE WITHOUT AURA AND WITHOUT STATUS MIGRAINOSUS, NOT INTRACTABLE: ICD-10-CM

## 2024-11-23 LAB
ALBUMIN SERPL BCG-MCNC: 4.5 G/DL (ref 3.5–5)
ALP SERPL-CCNC: 71 U/L (ref 34–104)
ALT SERPL W P-5'-P-CCNC: 24 U/L (ref 7–52)
ANION GAP SERPL CALCULATED.3IONS-SCNC: 5 MMOL/L (ref 4–13)
AST SERPL W P-5'-P-CCNC: 12 U/L (ref 13–39)
BASOPHILS # BLD AUTO: 0.03 THOUSANDS/ÂΜL (ref 0–0.1)
BASOPHILS NFR BLD AUTO: 1 % (ref 0–1)
BILIRUB SERPL-MCNC: 0.65 MG/DL (ref 0.2–1)
BUN SERPL-MCNC: 13 MG/DL (ref 5–25)
CALCIUM SERPL-MCNC: 9.3 MG/DL (ref 8.4–10.2)
CHLORIDE SERPL-SCNC: 105 MMOL/L (ref 96–108)
CHOLEST SERPL-MCNC: 218 MG/DL (ref ?–200)
CO2 SERPL-SCNC: 29 MMOL/L (ref 21–32)
CREAT SERPL-MCNC: 0.89 MG/DL (ref 0.6–1.3)
EOSINOPHIL # BLD AUTO: 0.17 THOUSAND/ÂΜL (ref 0–0.61)
EOSINOPHIL NFR BLD AUTO: 3 % (ref 0–6)
ERYTHROCYTE [DISTWIDTH] IN BLOOD BY AUTOMATED COUNT: 12.5 % (ref 11.6–15.1)
GFR SERPL CREATININE-BSD FRML MDRD: 98 ML/MIN/1.73SQ M
GLUCOSE P FAST SERPL-MCNC: 101 MG/DL (ref 65–99)
HCT VFR BLD AUTO: 47.5 % (ref 36.5–49.3)
HDLC SERPL-MCNC: 45 MG/DL
HGB BLD-MCNC: 15.8 G/DL (ref 12–17)
IMM GRANULOCYTES # BLD AUTO: 0.02 THOUSAND/UL (ref 0–0.2)
IMM GRANULOCYTES NFR BLD AUTO: 0 % (ref 0–2)
LDLC SERPL CALC-MCNC: 164 MG/DL (ref 0–100)
LYMPHOCYTES # BLD AUTO: 1.58 THOUSANDS/ÂΜL (ref 0.6–4.47)
LYMPHOCYTES NFR BLD AUTO: 28 % (ref 14–44)
MCH RBC QN AUTO: 29.1 PG (ref 26.8–34.3)
MCHC RBC AUTO-ENTMCNC: 33.3 G/DL (ref 31.4–37.4)
MCV RBC AUTO: 88 FL (ref 82–98)
MONOCYTES # BLD AUTO: 0.51 THOUSAND/ÂΜL (ref 0.17–1.22)
MONOCYTES NFR BLD AUTO: 9 % (ref 4–12)
NEUTROPHILS # BLD AUTO: 3.43 THOUSANDS/ÂΜL (ref 1.85–7.62)
NEUTS SEG NFR BLD AUTO: 59 % (ref 43–75)
NRBC BLD AUTO-RTO: 0 /100 WBCS
PLATELET # BLD AUTO: 186 THOUSANDS/UL (ref 149–390)
PMV BLD AUTO: 9.4 FL (ref 8.9–12.7)
POTASSIUM SERPL-SCNC: 4 MMOL/L (ref 3.5–5.3)
PROT SERPL-MCNC: 7.1 G/DL (ref 6.4–8.4)
RBC # BLD AUTO: 5.43 MILLION/UL (ref 3.88–5.62)
SODIUM SERPL-SCNC: 139 MMOL/L (ref 135–147)
TRIGL SERPL-MCNC: 47 MG/DL (ref ?–150)
WBC # BLD AUTO: 5.74 THOUSAND/UL (ref 4.31–10.16)

## 2024-11-23 PROCEDURE — 80061 LIPID PANEL: CPT

## 2024-11-23 PROCEDURE — 85025 COMPLETE CBC W/AUTO DIFF WBC: CPT

## 2024-11-23 PROCEDURE — 36415 COLL VENOUS BLD VENIPUNCTURE: CPT

## 2024-11-23 PROCEDURE — 80053 COMPREHEN METABOLIC PANEL: CPT

## 2024-11-25 ENCOUNTER — RESULTS FOLLOW-UP (OUTPATIENT)
Dept: NEUROLOGY | Facility: CLINIC | Age: 54
End: 2024-11-25

## 2025-04-14 ENCOUNTER — OFFICE VISIT (OUTPATIENT)
Dept: NEUROLOGY | Facility: CLINIC | Age: 55
End: 2025-04-14
Payer: COMMERCIAL

## 2025-04-14 VITALS
WEIGHT: 184.8 LBS | RESPIRATION RATE: 16 BRPM | HEART RATE: 84 BPM | SYSTOLIC BLOOD PRESSURE: 114 MMHG | TEMPERATURE: 98.8 F | OXYGEN SATURATION: 96 % | BODY MASS INDEX: 27.37 KG/M2 | DIASTOLIC BLOOD PRESSURE: 78 MMHG | HEIGHT: 69 IN

## 2025-04-14 DIAGNOSIS — R20.8 BURNING SENSATION: ICD-10-CM

## 2025-04-14 DIAGNOSIS — M25.50 ARTHRALGIA: ICD-10-CM

## 2025-04-14 DIAGNOSIS — E78.5 HYPERLIPIDEMIA: ICD-10-CM

## 2025-04-14 DIAGNOSIS — D33.3 VESTIBULAR SCHWANNOMA (HCC): Primary | ICD-10-CM

## 2025-04-14 DIAGNOSIS — M54.2 CERVICALGIA: ICD-10-CM

## 2025-04-14 PROCEDURE — 99213 OFFICE O/P EST LOW 20 MIN: CPT | Performed by: NURSE PRACTITIONER

## 2025-04-14 RX ORDER — GABAPENTIN 100 MG/1
100 CAPSULE ORAL 3 TIMES DAILY
Qty: 270 CAPSULE | Refills: 3 | Status: SHIPPED | OUTPATIENT
Start: 2025-04-14

## 2025-04-14 NOTE — PROGRESS NOTES
Name: Davion Kelly      : 1970      MRN: [id number]  Encounter Provider: LEE Horton  Encounter Date: 2025   Encounter department: [facility]  :  Assessment & Plan  Burning sensation    Orders:    gabapentin (NEURONTIN) 100 mg capsule; Take 1 capsule (100 mg total) by mouth 3 (three) times a day    Vestibular schwannoma (HCC)    Orders:    MRI brain IAC wo and w contrast; Future    Cervicalgia         Arthralgia         Hyperlipidemia           Assessment & Plan  1. Right vestibular schwannoma.  It has been about 3 years since his last MRI of the brain. A repeat MRI of the brain will be ordered today. He will continue his current regimen of gabapentin 100 mg three times daily. A prescription for a 3-month supply with three refills has been provided.    2. Hyperlipidemia.  His total cholesterol is 218 mg/dL, with an LDL of 164 mg/dL and an HDL of 45 mg/dL. He prefers to manage his cholesterol through diet and exercise rather than medication. He is advised to monitor his diet, particularly reducing intake of processed meats and foods high in cholesterol. If there is a significant mismatch between HDL and LDL levels or if LDL levels continue to rise, further treatment options will be considered.    3. Cluster headaches.  He reports having only one headache in the past year, which was managed with Advil. He has not refilled his sumatriptan prescription as he found Advil more effective. No changes in hearing or vision were reported.    4. Arthritis.  He experiences pain in his thumbs, particularly when clamping or picking up objects. He has been using turmeric for its anti-inflammatory properties, which has provided some relief. He is interested in exploring BPC-157 peptide for inflammation but requires more information. He is advised to consult an orthopedist for further evaluation and management of his arthritis symptoms.    Follow-up  The patient will follow up in 1  year.        History of Present Illness     History of Present Illness  The patient is a 54-year-old male who presents to the clinic today for a 1-year follow-up regarding his right vestibular schwannoma, hyperlipidemia, and cluster headaches.     He reports a significant improvement in his headache condition, with only one episode occurring in the past year, specifically in October 2024. He managed this episode with Advil, finding it more effective than sumatriptan, which he did not refill. The duration of the headache was approximately 1.5 to 2 hours, during which he sought relief in a dark environment. He reports no changes in his hearing or vision. He is scheduled for a [facility] physical examination, which includes a vision test, and he also consults an ophthalmologist for his vision. He has not had a hearing test in a couple of years. He has been on gabapentin for approximately 5 to 6 years, taking 300 mg daily divided into three doses: morning, dinner, and bedtime. He is curious about the long-term effects of gabapentin. He initiated gabapentin therapy in 2019 for a burning sensation in his neck that radiated down to his shoulders, initially suspecting a heart attack. He does not require any medication to facilitate his MRI procedure.    His total cholesterol level is slightly elevated at 218, but he prefers to manage it naturally rather than with medication. He maintains an active lifestyle, running around frequently and working 8 to 10 hours daily. He reports feeling well, sleeping adequately, and consuming red meat once or twice a week. He expresses a desire to avoid additional medications.    He experiences severe thumb pain, diagnosed as arthritis, for which he takes turmeric, finding it beneficial. He is considering BPC-157 peptide for inflammation and is seeking more information about it. He has recently experienced hip and knee pain, which has limited his ability to play basketball, a sport he enjoys. He  reports difficulty in clamping and lifting objects due to pain, which occasionally extends to other fingers. He also reports creaking in his neck but no significant pain or radiating pain down his arms.    FAMILY HISTORY  He does not have a family history of heart attack or stroke. His father had heart surgery for a flap in one of the valves. His father had diabetes with blood sugar levels around 400 but never had symptoms.    MEDICATIONS  Current: Advil, gabapentin, Lexapro        Migraine  Pertinent negatives include no abdominal pain, back pain, coughing, ear pain, eye pain, fever, seizures, sore throat or vomiting.      Review of Systems   Constitutional: Negative.  Negative for chills and fever.   HENT: Negative.  Negative for ear pain and sore throat.    Eyes: Negative.  Negative for pain and visual disturbance.   Respiratory: Negative.  Negative for cough and shortness of breath.    Cardiovascular: Negative.  Negative for chest pain and palpitations.   Gastrointestinal: Negative.  Negative for abdominal pain and vomiting.   Endocrine: Negative.    Genitourinary: Negative.  Negative for dysuria and hematuria.   Musculoskeletal: Negative.  Negative for arthralgias and back pain.   Skin: Negative.  Negative for color change and rash.   Allergic/Immunologic: Negative.    Neurological: Negative.  Negative for seizures and syncope.   Hematological: Negative.    Psychiatric/Behavioral: Negative.     All other systems reviewed and are negative.   I have personally reviewed the MA's review of systems and made changes as necessary.    Current Outpatient Medications on File Prior to Visit   Medication Sig Dispense Refill    escitalopram (LEXAPRO) 5 mg tablet Take 5 mg by mouth daily at bedtime      [DISCONTINUED] gabapentin (NEURONTIN) 100 mg capsule Take 1 capsule (100 mg total) by mouth 3 (three) times a day 90 capsule 0     No current facility-administered medications on file prior to visit.      Social History  "    Tobacco Use    Smoking status: Never    Smokeless tobacco: Never   Vaping Use    Vaping status: Never Used   Substance and Sexual Activity    Alcohol use: No    Drug use: No    Sexual activity: Not on file        Objective   /78 (BP Location: Right arm, Patient Position: Sitting, Cuff Size: Adult)   Pulse 84   Temp 98.8 °F (37.1 °C) (Temporal)   Resp 16   Ht 5' 9\" (1.753 m)   Wt 83.8 kg (184 lb 12.8 oz)   SpO2 96%   BMI 27.29 kg/m²     Physical Exam  Vitals reviewed.   Constitutional:       General: He is not in acute distress.  HENT:      Head: Normocephalic.      Right Ear: External ear normal. Decreased hearing noted.      Left Ear: External ear normal.      Nose: Nose normal.      Mouth/Throat:      Mouth: Mucous membranes are moist.      Pharynx: Oropharynx is clear.   Eyes:      General:         Right eye: No discharge.         Left eye: No discharge.      Extraocular Movements: Extraocular movements intact.      Pupils: Pupils are equal, round, and reactive to light.   Cardiovascular:      Rate and Rhythm: Normal rate and regular rhythm.      Pulses: Normal pulses.      Heart sounds: Normal heart sounds.   Pulmonary:      Effort: Pulmonary effort is normal.      Breath sounds: Normal breath sounds.   Abdominal:      General: There is no distension.      Tenderness: There is no abdominal tenderness.   Musculoskeletal:      Cervical back: Normal range of motion.      Right lower leg: No edema.      Left lower leg: No edema.   Skin:     General: Skin is warm.      Capillary Refill: Capillary refill takes less than 2 seconds.      Findings: No rash.   Neurological:      General: No focal deficit present.      Mental Status: He is alert. Mental status is at baseline.      Cranial Nerves: No cranial nerve deficit.      Sensory: No sensory deficit.      Motor: No weakness.      Coordination: Coordination normal.      Gait: Gait normal.      Deep Tendon Reflexes: Reflexes normal.   Psychiatric:       "   Mood and Affect: Mood normal.         Behavior: Behavior normal.       Neurological Exam  Mental Status  Alert.    Cranial Nerves  CN III, IV, VI: Extraocular movements intact bilaterally. Pupils equal round and reactive to light bilaterally.    Gait   Normal gait.

## 2025-04-14 NOTE — PATIENT INSTRUCTIONS
"Patient Education     Can foods or supplements lower cholesterol?   The Basics   Written by the doctors and editors at [facility]   Can I lower my cholesterol by changing my diet? -- Maybe. Some people can lower their cholesterol by changing their diet. But this does not always work. Still, you can improve your overall health by eating better.  If you have high cholesterol, it might help to avoid or limit saturated fats. These are found in foods like:   Red meat   Butter   Fried foods   Cheese   Baked goods, such as cookies, cakes, or brownies  Other things that might help lower cholesterol include:   Eating more soluble fiber - Soluble fiber is found in fruits, oats, barley, beans, and peas.   A vegetarian or vegan diet - A vegetarian diet contains no meat. A vegan diet contains no animal products at all, including meat, eggs, or milk.   Replacing meat with soy sometimes - Soy-based products include tofu and tempeh.  In general, you can improve your health by eating lots of fruits, vegetables, and whole grains. You can also cut back on carbohydrates, sweets, and processed foods.  Eating a \"Mediterranean diet\" might help lower your cholesterol. This type of diet:   Includes a lot of fruits, vegetables, nuts, and whole grains   Uses olive oil instead of other fats   Includes some fish, poultry, and dairy products, but not a lot of red meat  What about eggs? -- Eggs are OK if you want to eat them, but don't overdo it. The news often has stories about the health benefits or risks of eggs. The truth is, eggs are a good source of protein and do not raise cholesterol much. Saturated fats raise cholesterol levels more than eggs do.  Are there specific foods that can lower my cholesterol? -- Maybe. There are some foods that seem to help lower cholesterol, including:   Foods rich in omega-3 fatty acids - Studies show that people who eat lots of these foods are less likely to have heart disease than those who eat less of them. " "Examples include oily fish (such as salmon, herring, or tuna), olive oil, and canola oil. It's fine to eat 1 to 2 servings of oily fish a week.   Nuts - Some studies show that eating certain nuts can help lower cholesterol. They might even lower the risk of heart attack or death. These nuts include walnuts, almonds, and pistachios.   Fiber-rich foods - These foods seem to lower cholesterol and are generally good for your health. Examples include fruits, vegetables, beans, and oats. Some doctors even recommend taking fiber supplements.  What about  foods that claim to lower cholesterol? -- Be careful with these foods. There are now many foods that have added plant extracts called \"sterols\" or \"stanols.\" Examples include special margarines such as Benecol. Foods with added sterols or stanols can lower cholesterol. But it's not clear whether they help lower the risk of heart attack or stroke, or if they are safe to use long-term. Plus, research in animals shows that these extracts might actually cause health problems. Experts think more research is needed before they can recommend that people eat these foods.  Should I take supplements to lower my cholesterol? -- Maybe. Some research has shown that certain supplements can lower cholesterol. But there is almost no research showing that supplements can help prevent heart attacks, strokes, or any of the problems caused by high cholesterol. If you decide to try supplements, keep in mind that in the [place], the government does not regulate supplements very well. That means that what's on a supplement's label is not always actually in the bottle.  Here are some supplements that might help with cholesterol:   Red yeast rice - Red yeast rice helps lower cholesterol. It might contain monacolin K, which is the same ingredient that is in a prescription medicine to lower cholesterol. But the supplements that you can buy might not always have much monacolin K. If you are " interested in taking red yeast rice, talk to your doctor to see if the prescription medicine is a better choice.   Omega-3 fatty acid supplements - Some omega-3 fatty acid supplements might help lower cholesterol, but they might also raise it.  What supplements don't work? -- There is no good evidence that calcium, garlic, coconut oil, coconut water, resveratrol, policosanol, or soy isoflavone supplements lower cholesterol.  All topics are updated as new evidence becomes available and our peer review process is complete.  This topic retrieved from [employer] on: Feb 26, 2024.  Topic 87736 Version 20.0  Release: 32.2.4 - C32.56  © 2024 Up[employer]Date, Inc. and/or its affiliates. All rights reserved.  Consumer Information Use and Disclaimer   Disclaimer: This generalized information is a limited summary of diagnosis, treatment, and/or medication information. It is not meant to be comprehensive and should be used as a tool to help the user understand and/or assess potential diagnostic and treatment options. It does NOT include all information about conditions, treatments, medications, side effects, or risks that may apply to a specific patient. It is not intended to be medical advice or a substitute for the medical advice, diagnosis, or treatment of a health care provider based on the health care provider's examination and assessment of a patient's specific and unique circumstances. Patients must speak with a health care provider for complete information about their health, medical questions, and treatment options, including any risks or benefits regarding use of medications. This information does not endorse any treatments or medications as safe, effective, or approved for treating a specific patient. Up[employer], Inc. and its affiliates disclaim any warranty or liability relating to this information or the use thereof.The use of this information is governed by the Terms of Use, available at  https://www.[employer]uwer.com/en/know/clinical-effectiveness-terms. 2024© [employer] and its affiliates and/or licensors. All rights reserved.  Copyright   © 2024 [employer] and/or its affiliates. All rights reserved.

## 2025-05-14 ENCOUNTER — HOSPITAL ENCOUNTER (OUTPATIENT)
Dept: MRI IMAGING | Facility: HOSPITAL | Age: 55
Discharge: HOME | End: 2025-05-14
Attending: NURSE PRACTITIONER
Payer: COMMERCIAL

## 2025-05-14 DIAGNOSIS — D33.3 VESTIBULAR SCHWANNOMA (HCC): ICD-10-CM

## 2025-05-14 PROCEDURE — 70553 MRI BRAIN STEM W/O & W/DYE: CPT

## 2025-05-14 PROCEDURE — A9585 GADOBUTROL INJECTION: HCPCS | Performed by: NURSE PRACTITIONER

## 2025-05-14 RX ORDER — GADOBUTROL 604.72 MG/ML
8 INJECTION INTRAVENOUS
Status: COMPLETED | OUTPATIENT
Start: 2025-05-14 | End: 2025-05-14

## 2025-05-14 RX ADMIN — GADOBUTROL 8 ML: 604.72 INJECTION INTRAVENOUS at 21:02

## 2025-05-16 ENCOUNTER — RESULTS FOLLOW-UP (OUTPATIENT)
Dept: NEUROLOGY | Facility: CLINIC | Age: 55
End: 2025-05-16